# Patient Record
Sex: MALE | Race: WHITE | Employment: FULL TIME | URBAN - METROPOLITAN AREA
[De-identification: names, ages, dates, MRNs, and addresses within clinical notes are randomized per-mention and may not be internally consistent; named-entity substitution may affect disease eponyms.]

---

## 2018-06-28 ENCOUNTER — HOSPITAL ENCOUNTER (OUTPATIENT)
Age: 43
Discharge: HOME | End: 2018-06-30
Payer: COMMERCIAL

## 2018-06-28 ENCOUNTER — OFFICE VISIT (OUTPATIENT)
Dept: FAMILY MEDICINE CLINIC | Age: 43
End: 2018-06-28
Payer: COMMERCIAL

## 2018-06-28 VITALS
HEIGHT: 74 IN | BODY MASS INDEX: 34.65 KG/M2 | DIASTOLIC BLOOD PRESSURE: 84 MMHG | SYSTOLIC BLOOD PRESSURE: 120 MMHG | HEART RATE: 85 BPM | OXYGEN SATURATION: 98 % | WEIGHT: 270 LBS | TEMPERATURE: 97.4 F

## 2018-06-28 DIAGNOSIS — K76.0 NONALCOHOLIC FATTY LIVER DISEASE WITHOUT NONALCOHOLIC STEATOHEPATITIS (NASH): Primary | ICD-10-CM

## 2018-06-28 DIAGNOSIS — K76.0 NONALCOHOLIC FATTY LIVER DISEASE WITHOUT NONALCOHOLIC STEATOHEPATITIS (NASH): ICD-10-CM

## 2018-06-28 LAB
ALBUMIN SERPL-MCNC: 4.8 G/DL (ref 3.5–5.2)
ALP BLD-CCNC: 62 U/L (ref 40–129)
ALT SERPL-CCNC: 29 U/L (ref 0–40)
ANION GAP SERPL CALCULATED.3IONS-SCNC: 15 MMOL/L (ref 7–16)
AST SERPL-CCNC: 29 U/L (ref 0–39)
BILIRUB SERPL-MCNC: 0.7 MG/DL (ref 0–1.2)
BUN BLDV-MCNC: 18 MG/DL (ref 6–20)
CALCIUM SERPL-MCNC: 10 MG/DL (ref 8.6–10.2)
CHLORIDE BLD-SCNC: 99 MMOL/L (ref 98–107)
CO2: 27 MMOL/L (ref 22–29)
CREAT SERPL-MCNC: 1.4 MG/DL (ref 0.7–1.2)
GFR AFRICAN AMERICAN: >60
GFR NON-AFRICAN AMERICAN: 56 ML/MIN/1.73
GLUCOSE BLD-MCNC: 70 MG/DL (ref 74–109)
POTASSIUM SERPL-SCNC: 5.2 MMOL/L (ref 3.5–5)
SODIUM BLD-SCNC: 141 MMOL/L (ref 132–146)
TOTAL PROTEIN: 7.8 G/DL (ref 6.4–8.3)

## 2018-06-28 PROCEDURE — 4004F PT TOBACCO SCREEN RCVD TLK: CPT | Performed by: FAMILY MEDICINE

## 2018-06-28 PROCEDURE — 36415 COLL VENOUS BLD VENIPUNCTURE: CPT | Performed by: FAMILY MEDICINE

## 2018-06-28 PROCEDURE — 99213 OFFICE O/P EST LOW 20 MIN: CPT | Performed by: FAMILY MEDICINE

## 2018-06-28 PROCEDURE — 80053 COMPREHEN METABOLIC PANEL: CPT

## 2018-06-28 PROCEDURE — G8417 CALC BMI ABV UP PARAM F/U: HCPCS | Performed by: FAMILY MEDICINE

## 2018-06-28 PROCEDURE — G8427 DOCREV CUR MEDS BY ELIG CLIN: HCPCS | Performed by: FAMILY MEDICINE

## 2018-06-29 DIAGNOSIS — R79.89 ELEVATED SERUM CREATININE: Primary | ICD-10-CM

## 2018-07-02 ENCOUNTER — NURSE ONLY (OUTPATIENT)
Dept: FAMILY MEDICINE CLINIC | Age: 43
End: 2018-07-02
Payer: COMMERCIAL

## 2018-07-02 ENCOUNTER — HOSPITAL ENCOUNTER (OUTPATIENT)
Age: 43
Discharge: HOME | End: 2018-07-04
Payer: COMMERCIAL

## 2018-07-02 DIAGNOSIS — R79.89 ELEVATED SERUM CREATININE: Primary | ICD-10-CM

## 2018-07-02 DIAGNOSIS — R79.89 ELEVATED SERUM CREATININE: ICD-10-CM

## 2018-07-02 LAB
ANION GAP SERPL CALCULATED.3IONS-SCNC: 12 MMOL/L (ref 7–16)
BUN BLDV-MCNC: 14 MG/DL (ref 6–20)
CALCIUM SERPL-MCNC: 9.5 MG/DL (ref 8.6–10.2)
CHLORIDE BLD-SCNC: 102 MMOL/L (ref 98–107)
CO2: 26 MMOL/L (ref 22–29)
CREAT SERPL-MCNC: 1.2 MG/DL (ref 0.7–1.2)
GFR AFRICAN AMERICAN: >60
GFR NON-AFRICAN AMERICAN: >60 ML/MIN/1.73
GLUCOSE BLD-MCNC: 86 MG/DL (ref 74–109)
POTASSIUM SERPL-SCNC: 5.1 MMOL/L (ref 3.5–5)
SODIUM BLD-SCNC: 140 MMOL/L (ref 132–146)

## 2018-07-02 PROCEDURE — 80048 BASIC METABOLIC PNL TOTAL CA: CPT

## 2018-07-02 PROCEDURE — 36415 COLL VENOUS BLD VENIPUNCTURE: CPT | Performed by: FAMILY MEDICINE

## 2018-07-05 DIAGNOSIS — E87.5 HYPERKALEMIA: Primary | ICD-10-CM

## 2018-08-02 RX ORDER — LISINOPRIL 20 MG/1
20 TABLET ORAL DAILY
Qty: 30 TABLET | Refills: 5 | Status: SHIPPED | OUTPATIENT
Start: 2018-08-02 | End: 2019-03-06 | Stop reason: SDUPTHER

## 2018-09-06 ENCOUNTER — NURSE ONLY (OUTPATIENT)
Dept: FAMILY MEDICINE CLINIC | Age: 43
End: 2018-09-06
Payer: COMMERCIAL

## 2018-09-06 ENCOUNTER — HOSPITAL ENCOUNTER (OUTPATIENT)
Age: 43
Discharge: HOME | End: 2018-09-08
Payer: COMMERCIAL

## 2018-09-06 DIAGNOSIS — E87.5 HYPERKALEMIA: ICD-10-CM

## 2018-09-06 DIAGNOSIS — E87.5 HYPERKALEMIA: Primary | ICD-10-CM

## 2018-09-06 LAB — POTASSIUM SERPL-SCNC: 5 MMOL/L (ref 3.5–5)

## 2018-09-06 PROCEDURE — 36415 COLL VENOUS BLD VENIPUNCTURE: CPT | Performed by: FAMILY MEDICINE

## 2018-09-06 PROCEDURE — 84132 ASSAY OF SERUM POTASSIUM: CPT

## 2019-03-06 RX ORDER — LISINOPRIL 20 MG/1
20 TABLET ORAL DAILY
Qty: 30 TABLET | Refills: 5 | Status: SHIPPED | OUTPATIENT
Start: 2019-03-06 | End: 2019-04-02 | Stop reason: SDUPTHER

## 2019-03-15 ENCOUNTER — TELEPHONE (OUTPATIENT)
Dept: FAMILY MEDICINE CLINIC | Age: 44
End: 2019-03-15

## 2019-03-18 ENCOUNTER — TELEPHONE (OUTPATIENT)
Dept: FAMILY MEDICINE CLINIC | Age: 44
End: 2019-03-18

## 2019-03-25 ENCOUNTER — TELEPHONE (OUTPATIENT)
Dept: FAMILY MEDICINE CLINIC | Age: 44
End: 2019-03-25

## 2019-03-25 DIAGNOSIS — M10.071 ACUTE IDIOPATHIC GOUT INVOLVING TOE OF RIGHT FOOT: ICD-10-CM

## 2019-03-25 RX ORDER — COLCHICINE 0.6 MG/1
TABLET ORAL
Qty: 3 TABLET | Refills: 2 | Status: SHIPPED | OUTPATIENT
Start: 2019-03-25 | End: 2019-04-16

## 2019-03-25 RX ORDER — INDOMETHACIN 50 MG/1
CAPSULE ORAL
Qty: 8 CAPSULE | Refills: 2 | Status: SHIPPED | OUTPATIENT
Start: 2019-03-25 | End: 2019-05-09

## 2019-04-02 ENCOUNTER — HOSPITAL ENCOUNTER (OUTPATIENT)
Age: 44
Discharge: HOME | End: 2019-04-04
Payer: COMMERCIAL

## 2019-04-02 ENCOUNTER — OFFICE VISIT (OUTPATIENT)
Dept: FAMILY MEDICINE CLINIC | Age: 44
End: 2019-04-02
Payer: COMMERCIAL

## 2019-04-02 VITALS
WEIGHT: 284 LBS | HEIGHT: 74 IN | OXYGEN SATURATION: 99 % | HEART RATE: 89 BPM | TEMPERATURE: 97.9 F | BODY MASS INDEX: 36.45 KG/M2 | SYSTOLIC BLOOD PRESSURE: 134 MMHG | DIASTOLIC BLOOD PRESSURE: 88 MMHG

## 2019-04-02 DIAGNOSIS — E87.5 HYPERKALEMIA: ICD-10-CM

## 2019-04-02 DIAGNOSIS — E87.5 HYPERKALEMIA: Primary | ICD-10-CM

## 2019-04-02 DIAGNOSIS — I10 ESSENTIAL HYPERTENSION: ICD-10-CM

## 2019-04-02 LAB
ALBUMIN SERPL-MCNC: 4.8 G/DL (ref 3.5–5.2)
ALP BLD-CCNC: 73 U/L (ref 40–129)
ALT SERPL-CCNC: 39 U/L (ref 0–40)
ANION GAP SERPL CALCULATED.3IONS-SCNC: 14 MMOL/L (ref 7–16)
AST SERPL-CCNC: 39 U/L (ref 0–39)
BASOPHILS ABSOLUTE: 0.08 E9/L (ref 0–0.2)
BASOPHILS RELATIVE PERCENT: 0.8 % (ref 0–2)
BILIRUB SERPL-MCNC: 0.4 MG/DL (ref 0–1.2)
BUN BLDV-MCNC: 19 MG/DL (ref 6–20)
CALCIUM SERPL-MCNC: 10 MG/DL (ref 8.6–10.2)
CHLORIDE BLD-SCNC: 100 MMOL/L (ref 98–107)
CO2: 26 MMOL/L (ref 22–29)
CREAT SERPL-MCNC: 1.6 MG/DL (ref 0.7–1.2)
EOSINOPHILS ABSOLUTE: 0.37 E9/L (ref 0.05–0.5)
EOSINOPHILS RELATIVE PERCENT: 3.7 % (ref 0–6)
GFR AFRICAN AMERICAN: 58
GFR NON-AFRICAN AMERICAN: 48 ML/MIN/1.73
GLUCOSE BLD-MCNC: 85 MG/DL (ref 74–99)
HCT VFR BLD CALC: 46.4 % (ref 37–54)
HEMOGLOBIN: 15.3 G/DL (ref 12.5–16.5)
IMMATURE GRANULOCYTES #: 0.05 E9/L
IMMATURE GRANULOCYTES %: 0.5 % (ref 0–5)
LYMPHOCYTES ABSOLUTE: 2.68 E9/L (ref 1.5–4)
LYMPHOCYTES RELATIVE PERCENT: 26.6 % (ref 20–42)
MCH RBC QN AUTO: 28.9 PG (ref 26–35)
MCHC RBC AUTO-ENTMCNC: 33 % (ref 32–34.5)
MCV RBC AUTO: 87.5 FL (ref 80–99.9)
MONOCYTES ABSOLUTE: 0.71 E9/L (ref 0.1–0.95)
MONOCYTES RELATIVE PERCENT: 7 % (ref 2–12)
NEUTROPHILS ABSOLUTE: 6.2 E9/L (ref 1.8–7.3)
NEUTROPHILS RELATIVE PERCENT: 61.4 % (ref 43–80)
PDW BLD-RTO: 12.6 FL (ref 11.5–15)
PLATELET # BLD: 357 E9/L (ref 130–450)
PMV BLD AUTO: 9.9 FL (ref 7–12)
POTASSIUM SERPL-SCNC: 4.9 MMOL/L (ref 3.5–5)
RBC # BLD: 5.3 E12/L (ref 3.8–5.8)
SODIUM BLD-SCNC: 140 MMOL/L (ref 132–146)
TOTAL PROTEIN: 8.2 G/DL (ref 6.4–8.3)
WBC # BLD: 10.1 E9/L (ref 4.5–11.5)

## 2019-04-02 PROCEDURE — 85025 COMPLETE CBC W/AUTO DIFF WBC: CPT

## 2019-04-02 PROCEDURE — 99213 OFFICE O/P EST LOW 20 MIN: CPT | Performed by: FAMILY MEDICINE

## 2019-04-02 PROCEDURE — 80053 COMPREHEN METABOLIC PANEL: CPT

## 2019-04-02 RX ORDER — LISINOPRIL 40 MG/1
40 TABLET ORAL DAILY
Qty: 7 TABLET | Refills: 0 | Status: SHIPPED | OUTPATIENT
Start: 2019-04-02 | End: 2019-04-04

## 2019-04-02 ASSESSMENT — PATIENT HEALTH QUESTIONNAIRE - PHQ9
2. FEELING DOWN, DEPRESSED OR HOPELESS: 0
SUM OF ALL RESPONSES TO PHQ9 QUESTIONS 1 & 2: 0
SUM OF ALL RESPONSES TO PHQ QUESTIONS 1-9: 0
1. LITTLE INTEREST OR PLEASURE IN DOING THINGS: 0
SUM OF ALL RESPONSES TO PHQ QUESTIONS 1-9: 0

## 2019-04-02 NOTE — PROGRESS NOTES
[facility]  Office Progress Note - Dr. Shaniqua Chavez  4/2/19    Chief Complaint   Patient presents with    Hypertension        S: about 3 weeks ago started with headaches. Wasn't feeling right. 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 Bumped up lisinopril to 30mg and then to 40mg. Has been checking since then and seeing 130s/80s-90s. Denies CP, sob, abd pain, + headaches, No vision changes. Has tx headache with ibu. Resolved since increasing lisinopril. Hx HTN since 28 yo or so. Wt Readings from Last 3 Encounters:   04/02/19 284 lb (128.8 kg)   06/28/18 270 lb (122.5 kg)   02/26/18 289 lb (131.1 kg)     Has decreased salt consumption. Did have a gout flare recently as well. About 2 weeks ago. Called and sent in indomethacin. Did not take colchicine since he was about 10 days into an attack. Shae screening neg. Family History   Problem Relation Age of Onset    Heart Disease Father        Past Surgical History:   Procedure Laterality Date    CYST REMOVAL Right     Back    WISDOM TOOTH EXTRACTION         Social History     Tobacco Use    Smoking status: Never Smoker    Smokeless tobacco: Current User     Types: Chew   Substance Use Topics    Alcohol use: Yes     Comment: SOCIAL    Drug use: No       ROS:  No CP, No palpitations,   No sob, No cough,   No abd pain, No heartburn,   +headaches,   No tingling, No numbness, No weakness,   No bowel changes, No hematochezia, No melena,  No bladder changes, No hematuria  No skin rashes, No skin lesions. No vision changes, No hearing changes,   No polyuria, polydipsia, polyphagia. Stable mood. ROS otherwise negative unless as listed in HPI. Chart reviewed and updated where appropriate for PMH, Fam, and Soc Hx.     Physical Exam   /88 (Site: Right Upper Arm, Position: Sitting, Cuff Size: Large Adult)   Pulse 89   Temp 97.9 °F (36.6 °C) (Oral)   Ht 6' 2\" (1.88 m)   Wt 284 lb (128.8 kg)   SpO2 99%   BMI 36.46 kg/m²   Wt Readings from Last 3 Encounters:   04/02/19 284 lb (128.8 kg)   06/28/18 270 lb (122.5 kg)   02/26/18 289 lb (131.1 kg)       Constitutional:    He is oriented to person, place, and time. He appears well-developed and well-nourished. HENT:    Nose: Nose normal.    Mouth/Throat: Oropharynx is clear and moist.   Eyes:    Conjunctivae are normal.    Pupils are equal, round, and reactive to light. EOMI. Neck:    Normal range of motion. No thyromegaly or nodules noted. No bruit. Cardiovascular:    Normal rate, regular rhythm and normal heart sounds. No murmur. No gallop and no friction rub. Pulmonary/Chest:    Effort normal and breath sounds normal.    No wheezes. No rales or rhonchi. Abdominal:    Soft. Bowel sounds are normal.    No distension. No tenderness. Musculoskeletal:    Normal range of motion. No joint swelling noted. No peripheral edema. Current Outpatient Medications on File Prior to Visit   Medication Sig Dispense Refill    indomethacin (INDOCIN) 50 MG capsule 1 capsule by mouth 2 times daily during gout flares. 8 capsule 2    colchicine (COLCRYS) 0.6 MG tablet Take two tablets by mouth and then an additional tablet an hour later. May cause diarrhea. Take at onset of gout flares. 3 tablet 2     No current facility-administered medications on file prior to visit. Patient Active Problem List   Diagnosis Code    Pure hypercholesterolemia E78.00       Miracle / Jose Alejandro Cookie was seen today for hypertension. Diagnoses and all orders for this visit:    Hyperkalemia  -     Comprehensive Metabolic Panel; Future    Essential hypertension  -     CBC Auto Differential; Future  -     Comprehensive Metabolic Panel; Future  Continued increased dose of lisinopril at 40mg daily. Check labs in response to recent increase. Check for CHERYL given young HTN dx. Doubt hyperaldo as K+ has always been normal to slightly high.      Other orders  -     aluminum chloride (DRYSOL) 20 % external solution; Apply topically nightly. Return in about 6 weeks (around 5/14/2019). Patient counseled to follow up sooner or seek more acute care if symptoms worsening. Electronically signed by Asmita Antonio MD on 4/7/2019    This note may have been created using dictation software.  Efforts were made to reduce grammatical or syntax errors, but some may persist.

## 2019-04-04 DIAGNOSIS — R79.89 ELEVATED SERUM CREATININE: Primary | ICD-10-CM

## 2019-04-04 RX ORDER — AMLODIPINE BESYLATE 5 MG/1
5 TABLET ORAL DAILY
Qty: 30 TABLET | Refills: 0 | Status: SHIPPED | OUTPATIENT
Start: 2019-04-04 | End: 2019-04-25

## 2019-04-16 ENCOUNTER — NURSE ONLY (OUTPATIENT)
Dept: FAMILY MEDICINE CLINIC | Age: 44
End: 2019-04-16
Payer: COMMERCIAL

## 2019-04-16 ENCOUNTER — TELEPHONE (OUTPATIENT)
Dept: FAMILY MEDICINE CLINIC | Age: 44
End: 2019-04-16

## 2019-04-16 ENCOUNTER — HOSPITAL ENCOUNTER (OUTPATIENT)
Age: 44
Discharge: HOME | End: 2019-04-18
Payer: COMMERCIAL

## 2019-04-16 DIAGNOSIS — R79.89 ELEVATED SERUM CREATININE: Primary | ICD-10-CM

## 2019-04-16 DIAGNOSIS — R79.89 ELEVATED SERUM CREATININE: ICD-10-CM

## 2019-04-16 PROCEDURE — 80048 BASIC METABOLIC PNL TOTAL CA: CPT

## 2019-04-16 PROCEDURE — 36415 COLL VENOUS BLD VENIPUNCTURE: CPT | Performed by: FAMILY MEDICINE

## 2019-04-16 RX ORDER — PREDNISONE 50 MG/1
50 TABLET ORAL DAILY
Qty: 5 TABLET | Refills: 0 | Status: SHIPPED | OUTPATIENT
Start: 2019-04-16 | End: 2019-04-21

## 2019-04-16 NOTE — TELEPHONE ENCOUNTER
Pt was in today and expressed that his gout has flared up. He now has it in the left foot. He reports colchicine gave him diarrhea and indomethacin is not helping. He had been told in the past by a doctor that BP med could cause gout.

## 2019-04-16 NOTE — TELEPHONE ENCOUNTER
Yes, one type of blood pressure medication and contribute to gout. He is not on that kind of blood pressure medication.     I sent prednisone to pharmacy instead of colchicine

## 2019-04-17 ENCOUNTER — HOSPITAL ENCOUNTER (OUTPATIENT)
Dept: ULTRASOUND IMAGING | Age: 44
Discharge: HOME | End: 2019-04-19
Payer: COMMERCIAL

## 2019-04-17 DIAGNOSIS — I10 ESSENTIAL HYPERTENSION: ICD-10-CM

## 2019-04-17 LAB
ANION GAP SERPL CALCULATED.3IONS-SCNC: 17 MMOL/L (ref 7–16)
BUN BLDV-MCNC: 13 MG/DL (ref 6–20)
CALCIUM SERPL-MCNC: 9.5 MG/DL (ref 8.6–10.2)
CHLORIDE BLD-SCNC: 102 MMOL/L (ref 98–107)
CO2: 22 MMOL/L (ref 22–29)
CREAT SERPL-MCNC: 1.1 MG/DL (ref 0.7–1.2)
GFR AFRICAN AMERICAN: >60
GFR NON-AFRICAN AMERICAN: >60 ML/MIN/1.73
GLUCOSE BLD-MCNC: 81 MG/DL (ref 74–99)
POTASSIUM SERPL-SCNC: 4.3 MMOL/L (ref 3.5–5)
SODIUM BLD-SCNC: 141 MMOL/L (ref 132–146)

## 2019-04-17 PROCEDURE — 93975 VASCULAR STUDY: CPT

## 2019-04-17 PROCEDURE — 76770 US EXAM ABDO BACK WALL COMP: CPT

## 2019-04-25 RX ORDER — PREDNISONE 10 MG/1
TABLET ORAL
Qty: 50 TABLET | Refills: 0 | OUTPATIENT
Start: 2019-04-25 | End: 2019-06-10 | Stop reason: SDUPTHER

## 2019-04-25 RX ORDER — AMLODIPINE BESYLATE 10 MG/1
10 TABLET ORAL DAILY
Qty: 30 TABLET | Refills: 1 | Status: SHIPPED | OUTPATIENT
Start: 2019-04-25 | End: 2019-07-02 | Stop reason: SDUPTHER

## 2019-05-09 ENCOUNTER — OFFICE VISIT (OUTPATIENT)
Dept: FAMILY MEDICINE CLINIC | Age: 44
End: 2019-05-09
Payer: COMMERCIAL

## 2019-05-09 VITALS
TEMPERATURE: 97.9 F | HEART RATE: 80 BPM | SYSTOLIC BLOOD PRESSURE: 140 MMHG | BODY MASS INDEX: 36.85 KG/M2 | DIASTOLIC BLOOD PRESSURE: 90 MMHG | OXYGEN SATURATION: 96 % | WEIGHT: 287 LBS

## 2019-05-09 DIAGNOSIS — I10 ESSENTIAL HYPERTENSION: Primary | ICD-10-CM

## 2019-05-09 DIAGNOSIS — M1A.0790 IDIOPATHIC CHRONIC GOUT OF FOOT WITHOUT TOPHUS, UNSPECIFIED LATERALITY: ICD-10-CM

## 2019-05-09 PROCEDURE — 99214 OFFICE O/P EST MOD 30 MIN: CPT | Performed by: FAMILY MEDICINE

## 2019-05-09 RX ORDER — ALLOPURINOL 100 MG/1
100 TABLET ORAL DAILY
Qty: 30 TABLET | Refills: 2 | Status: SHIPPED | OUTPATIENT
Start: 2019-05-09 | End: 2019-06-12

## 2019-05-09 RX ORDER — LOSARTAN POTASSIUM 25 MG/1
25 TABLET ORAL DAILY
Qty: 30 TABLET | Refills: 0 | Status: SHIPPED | OUTPATIENT
Start: 2019-05-09 | End: 2019-06-12

## 2019-05-09 NOTE — PROGRESS NOTES
[facility]  Office Progress Note - Dr. Bonny Nava  5/9/19    Chief Complaint   Patient presents with    Hypertension     still not under control - running 150/92        S:   Hypertension  Follow-up  Blood pressure is borderline controlled today. BP Readings from Last 3 Encounters:   05/09/19 (!) 140/90   04/02/19 134/88   06/28/18 120/84   Now on max dose amlodipine. Patient continues medications regularly. Compliance is good. Denies CP, sob, abd pain, headaches, vision changes, dizziness, hypotensive symptoms. No side effects from medications noted. Mother FH HTN, and sone dealing with some mild HTN problems. PAtient neg for CHERYL screening with US.   DUSTY unlikely based on screening. Still seeing elevated readings at home. Cr bump noted on lisinopril resolved off medication. 1.2 >  1.6 > 1.1      Gout  Feels like just hasnt calmed over past couple months. Not as severe after Tx with 2 courses of abx, most recently long taper. Colchicine gave significant diarrhea and did not want to repeat. Forefoot. BL. + Red, + swelling, + ttp. Out of acute episode. Family History   Problem Relation Age of Onset    Heart Disease Father        Past Surgical History:   Procedure Laterality Date    CYST REMOVAL Right     Back    WISDOM TOOTH EXTRACTION         Social History     Tobacco Use    Smoking status: Never Smoker    Smokeless tobacco: Current User     Types: Chew   Substance Use Topics    Alcohol use: Yes     Comment: SOCIAL    Drug use: No       ROS:  No CP, No palpitations,   No sob, No cough,   No abd pain, No heartburn,   No headaches,   No tingling, No numbness, No weakness,   No bowel changes, No hematochezia, No melena,  No bladder changes, No hematuria  No skin rashes, No skin lesions. No vision changes, No hearing changes,   No polyuria, polydipsia, polyphagia. Stable mood. ROS otherwise negative unless as listed in HPI.     Chart reviewed and updated where appropriate for PMH, Fam, and Soc Hx. Physical Exam   BP (!) 140/90 (Site: Right Upper Arm, Position: Sitting, Cuff Size: Large Adult)   Pulse 80   Temp 97.9 °F (36.6 °C) (Oral)   Wt 287 lb (130.2 kg)   SpO2 96%   BMI 36.85 kg/m²   Wt Readings from Last 3 Encounters:   05/09/19 287 lb (130.2 kg)   04/02/19 284 lb (128.8 kg)   06/28/18 270 lb (122.5 kg)       Constitutional:    He is oriented to person, place, and time. He appears well-developed and well-nourished. HENT:    Right Ear: Tympanic membrane, external ear and ear canal normal.    Left Ear: Tympanic membrane, external ear and ear canal normal.     Nose: Nose normal.    Mouth/Throat: Oropharynx is clear and moist.   Eyes:    Conjunctivae are normal.    Pupils are equal, round, and reactive to light. EOMI. Neck:    Normal range of motion. No thyromegaly or nodules noted. No bruit. Left posterior ear / neck cyst - about 1x2cm or so, feels to be in the skin layer. Cyst vs lipoma likely. Had a cyst on his back removed hisotircally. Cardiovascular:    Normal rate, regular rhythm and normal heart sounds. No murmur. No gallop and no friction rub. Pulmonary/Chest:    Effort normal and breath sounds normal.    No wheezes. No rales or rhonchi. Abdominal:    Soft. Bowel sounds are normal.    No distension. No tenderness. Musculoskeletal:    Normal range of motion. +joint swelling noted - feet. No peripheral edema. Skin:    Skin is warm and dry. No rashes, lesions. Psychiatric:    He has a normal mood and affect. Normal groom and dress. Current Outpatient Medications on File Prior to Visit   Medication Sig Dispense Refill    amLODIPine (NORVASC) 10 MG tablet Take 1 tablet by mouth daily For blood pressure. 30 tablet 1    aluminum chloride (DRYSOL) 20 % external solution Apply topically nightly. 60 mL 2     No current facility-administered medications on file prior to visit.         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 Diagnoses and all orders for this visit:    Essential hypertension, persisting high despite inc amlodpine to max dose. Improved from previous highs.   -   Add  losartan (COZAAR) 25 MG tablet; Take 1 tablet by mouth daily  Close follow up on Cr as had bump with lisinopril. Cont Amlodipine. CHERYL US scan negative. K+ not suggestive of hyperaldo.   + Family History of HTN in mom and son also working on it. Patient with this for decades. Idiopathic chronic gout of foot without tophus, unspecified laterality, not resolved  -  Start   allopurinol (ZYLOPRIM) 100 MG tablet; Take 1 tablet by mouth daily For gout prevention. Complete prednisone taper - nearly done. Labs in 2 weeks, appt in 1 month  Patient counseled to follow up sooner or seek more acute care if symptoms worsening. Electronically signed by Janet Gaitan MD on 5/12/2019    This note may have been created using dictation software.  Efforts were made to reduce grammatical or syntax errors, but some may persist.

## 2019-05-12 PROBLEM — I10 ESSENTIAL HYPERTENSION: Status: ACTIVE | Noted: 2019-05-12

## 2019-05-12 PROBLEM — M1A.0790 IDIOPATHIC CHRONIC GOUT OF FOOT WITHOUT TOPHUS: Status: ACTIVE | Noted: 2019-05-12

## 2019-05-21 ENCOUNTER — TELEPHONE (OUTPATIENT)
Dept: FAMILY MEDICINE CLINIC | Age: 44
End: 2019-05-21

## 2019-05-21 DIAGNOSIS — M1A.0790 IDIOPATHIC CHRONIC GOUT OF FOOT WITHOUT TOPHUS, UNSPECIFIED LATERALITY: Primary | ICD-10-CM

## 2019-05-21 DIAGNOSIS — I10 ESSENTIAL HYPERTENSION: ICD-10-CM

## 2019-05-28 ENCOUNTER — NURSE ONLY (OUTPATIENT)
Dept: FAMILY MEDICINE CLINIC | Age: 44
End: 2019-05-28
Payer: COMMERCIAL

## 2019-05-28 ENCOUNTER — HOSPITAL ENCOUNTER (OUTPATIENT)
Age: 44
Discharge: HOME | End: 2019-05-30
Payer: COMMERCIAL

## 2019-05-28 DIAGNOSIS — I10 ESSENTIAL HYPERTENSION: ICD-10-CM

## 2019-05-28 DIAGNOSIS — M1A.0790 IDIOPATHIC CHRONIC GOUT OF FOOT WITHOUT TOPHUS, UNSPECIFIED LATERALITY: ICD-10-CM

## 2019-05-28 DIAGNOSIS — M1A.0790 IDIOPATHIC CHRONIC GOUT OF FOOT WITHOUT TOPHUS, UNSPECIFIED LATERALITY: Primary | ICD-10-CM

## 2019-05-28 PROCEDURE — 36415 COLL VENOUS BLD VENIPUNCTURE: CPT | Performed by: FAMILY MEDICINE

## 2019-05-28 PROCEDURE — 80048 BASIC METABOLIC PNL TOTAL CA: CPT

## 2019-05-28 PROCEDURE — 84550 ASSAY OF BLOOD/URIC ACID: CPT

## 2019-05-29 LAB
ANION GAP SERPL CALCULATED.3IONS-SCNC: 15 MMOL/L (ref 7–16)
BUN BLDV-MCNC: 12 MG/DL (ref 6–20)
CALCIUM SERPL-MCNC: 9.9 MG/DL (ref 8.6–10.2)
CHLORIDE BLD-SCNC: 100 MMOL/L (ref 98–107)
CO2: 25 MMOL/L (ref 22–29)
CREAT SERPL-MCNC: 1.1 MG/DL (ref 0.7–1.2)
GFR AFRICAN AMERICAN: >60
GFR NON-AFRICAN AMERICAN: >60 ML/MIN/1.73
GLUCOSE BLD-MCNC: 74 MG/DL (ref 74–99)
POTASSIUM SERPL-SCNC: 4.5 MMOL/L (ref 3.5–5)
SODIUM BLD-SCNC: 140 MMOL/L (ref 132–146)
URIC ACID, SERUM: 5.8 MG/DL (ref 3.4–7)

## 2019-06-10 ENCOUNTER — TELEPHONE (OUTPATIENT)
Dept: FAMILY MEDICINE CLINIC | Age: 44
End: 2019-06-10

## 2019-06-10 RX ORDER — PREDNISONE 10 MG/1
TABLET ORAL
Qty: 50 TABLET | Refills: 0 | OUTPATIENT
Start: 2019-06-10 | End: 2019-06-20

## 2019-06-10 NOTE — TELEPHONE ENCOUNTER
Prednisone Rx ordered, please phone to pharmacy. Will need to increase allopurinol dose (after gout flare over). If still happening after allopurinol dose increase, then will need to consider other diagnoses. Which foot? And is it always that foot?

## 2019-06-10 NOTE — TELEPHONE ENCOUNTER
Patient phoned and states he is having a bad flare up of gout in his foot, he can barely walk. He takes Allopurinol daily but its not helping. Please advise.

## 2019-06-10 NOTE — TELEPHONE ENCOUNTER
Notified patient. Patient verbalized understanding. Called script into pharmacy. Pt states it is his left foot and it is mostly that foot. He reports his right knee is swollen and stiff, this is new and has never happened before.

## 2019-06-12 ENCOUNTER — OFFICE VISIT (OUTPATIENT)
Dept: FAMILY MEDICINE CLINIC | Age: 44
End: 2019-06-12
Payer: COMMERCIAL

## 2019-06-12 VITALS
DIASTOLIC BLOOD PRESSURE: 90 MMHG | HEART RATE: 97 BPM | WEIGHT: 287 LBS | OXYGEN SATURATION: 98 % | TEMPERATURE: 98.3 F | HEIGHT: 74 IN | BODY MASS INDEX: 36.83 KG/M2 | SYSTOLIC BLOOD PRESSURE: 146 MMHG

## 2019-06-12 DIAGNOSIS — M1A.0790 IDIOPATHIC CHRONIC GOUT OF FOOT WITHOUT TOPHUS, UNSPECIFIED LATERALITY: ICD-10-CM

## 2019-06-12 DIAGNOSIS — I10 ESSENTIAL HYPERTENSION: ICD-10-CM

## 2019-06-12 PROCEDURE — 99213 OFFICE O/P EST LOW 20 MIN: CPT | Performed by: FAMILY MEDICINE

## 2019-06-12 RX ORDER — ALLOPURINOL 300 MG/1
300 TABLET ORAL DAILY
Qty: 30 TABLET | Refills: 2 | Status: SHIPPED | OUTPATIENT
Start: 2019-06-12 | End: 2019-10-01 | Stop reason: SDUPTHER

## 2019-06-12 RX ORDER — LOSARTAN POTASSIUM 50 MG/1
50 TABLET ORAL DAILY
Qty: 30 TABLET | Refills: 2 | Status: SHIPPED
Start: 2019-06-12 | End: 2020-09-11

## 2019-06-12 NOTE — PROGRESS NOTES
[facility]  Office Progress Note - Dr. Tomy Lu  6/12/19    Chief Complaint   Patient presents with    Hypertension    Medication Refill        S:   Hypertension  Follow-up  Blood pressure is not controlled today. He did not take his losartan for the past 2 or 3 days because he ran out. BP Readings from Last 3 Encounters:   06/12/19 (!) 146/90   05/09/19 (!) 140/90   04/02/19 134/88   He continues amlodipine 10 mg daily and we added 25 mg of losartan 1 month ago. He has been tolerating that without issue. He has generally felt well. Blood pressure measurements have had improved systolic readings, but diastolic readings are still regularly in the 90s. Patient continues medications regularly. Compliance is good. Denies CP, sob, abd pain, headaches, vision changes, dizziness, hypotensive symptoms. No side effects from medications noted. Gout  We started him on allopurinol after his last gout flare. He tolerated this without issue, but still had another recent gout flare while on the allopurinol medication. His uric acid level was recently evaluated while on the allopurinol and was normal at 5.8. Just a few days ago he called with redness swelling and pain of his first MTP joint on his left foot. This is his usual gouty joint. He also interestingly had some left knee pain and swelling and also some mild left elbow pain. He was prescribed prednisone and things quickly calmed down after the first dose or so that medication. Uric acid was elevated in the past at 7.1 few years ago. He has been eating some occasional meals that have higher purines.     Family History   Problem Relation Age of Onset    Heart Disease Father        Past Surgical History:   Procedure Laterality Date    CYST REMOVAL Right     Back    WISDOM TOOTH EXTRACTION         Social History     Tobacco Use    Smoking status: Never Smoker    Smokeless tobacco: Current User     Types: Chew   Substance Use Topics    Alcohol use: Yes     Comment: SOCIAL    Drug use: No       ROS:  No CP, No palpitations,   No sob, No cough,   No abd pain, No heartburn,   No headaches,   No tingling, No numbness, No weakness,   No bowel changes, No hematochezia, No melena,  No bladder changes, No hematuria  No skin rashes, No skin lesions. No vision changes, No hearing changes,   No polyuria, polydipsia, polyphagia. Stable mood. ROS otherwise negative unless as listed in HPI. Chart reviewed and updated where appropriate for PMH, Fam, and Soc Hx. Physical Exam   BP (!) 146/90 (Site: Right Upper Arm, Position: Sitting, Cuff Size: Large Adult)   Pulse 97   Temp 98.3 °F (36.8 °C) (Oral)   Ht 6' 2\" (1.88 m)   Wt 287 lb (130.2 kg)   SpO2 98%   BMI 36.85 kg/m²   Wt Readings from Last 3 Encounters:   06/12/19 287 lb (130.2 kg)   05/09/19 287 lb (130.2 kg)   04/02/19 284 lb (128.8 kg)       Constitutional:    He is oriented to person, place, and time. He appears well-developed and well-nourished. HENT:    Nose: Nose normal.    Mouth/Throat: Oropharynx is clear and moist.   Eyes:    Conjunctivae are normal.    Pupils are equal, round, and reactive to light. EOMI. Neck:    Normal range of motion. No thyromegaly or nodules noted. No bruit. Cardiovascular:    Normal rate, regular rhythm and normal heart sounds. No murmur. No gallop and no friction rub. Pulmonary/Chest:    Effort normal and breath sounds normal.    No wheezes. No rales or rhonchi. Abdominal:    Soft. Bowel sounds are normal.    No distension. No tenderness. Musculoskeletal:    Normal range of motion. No joint swelling noted. No redness noted. No peripheral edema. Skin:    Skin is warm and dry. No rashes, lesions.     Current Outpatient Medications on File Prior to Visit   Medication Sig Dispense Refill    predniSONE (DELTASONE) 10 MG tablet 5 tablets by mouth once a day x 3 days, then 4 tablets by mouth once a day x 3 days, then 3 6/12/2019    This note may have been created using dictation software.  Efforts were made to reduce grammatical or syntax errors, but some may persist.

## 2019-06-12 NOTE — PATIENT INSTRUCTIONS
Patient Education        Purine-Restricted Diet: Care Instructions  Your Care Instructions    Purines are substances that are found in some foods. Your body turns purines into uric acid. High levels of uric acid can cause gout, which is a form of arthritis that causes pain and inflammation in joints. You may be able to help control the amount of uric acid in your body by limiting high-purine foods in your diet. Follow-up care is a key part of your treatment and safety. Be sure to make and go to all appointments, and call your doctor if you are having problems. It's also a good idea to know your test results and keep a list of the medicines you take. How can you care for yourself at home? · Plan your meals and snacks around foods that are low in purines and are safe for you to eat. These foods include:  ? Green vegetables and tomatoes. ? Fruits. ? Whole-grain breads, rice, and cereals. ? Eggs, peanut butter, and nuts. ? Low-fat milk, cheese, and other milk products. ? Popcorn. ? Gelatin desserts, chocolate, cocoa, and cakes and sweets, in small amounts. · You can eat certain foods that are medium-high in purines, but eat them only once in a while. These foods include:  ? Legumes, such as dried beans and dried peas. You can have 1 cup cooked legumes each day. ? Asparagus, cauliflower, spinach, mushrooms, and green peas. ? Fish and seafood (other than very high-purine seafood). ? Oatmeal, wheat bran, and wheat germ. · Limit very high-purine foods, including:  ? Organ meats, such as liver, kidneys, sweetbreads, and brains. ? Meats, including dooley, beef, pork, and lamb. ? Game meats and any other meats in large amounts. ? Anchovies, sardines, herring, mackerel, and scallops. ? Gravy. ? Beer. Where can you learn more? Go to https://chpepiceweb.[employer]. org and sign in to your [facility] account.  Enter F448 in the [facility] box to learn more about \"Purine-Restricted Diet: Care Instructions. \"     If you do not have an account, please click on the \"Sign Up Now\" link. Current as of: November 7, 2018  Content Version: 12.0  © 2280-1001 [employer] Incorporated. Care instructions adapted under license by [facility] ([facility]). If you have questions about a medical condition or this instruction, always ask your healthcare professional. [address] any warranty or liability for your use of this information.

## 2019-06-28 ENCOUNTER — TELEPHONE (OUTPATIENT)
Dept: FAMILY MEDICINE CLINIC | Age: 44
End: 2019-06-28

## 2019-06-28 ENCOUNTER — NURSE ONLY (OUTPATIENT)
Dept: FAMILY MEDICINE CLINIC | Age: 44
End: 2019-06-28

## 2019-06-28 VITALS — SYSTOLIC BLOOD PRESSURE: 142 MMHG | DIASTOLIC BLOOD PRESSURE: 80 MMHG

## 2019-06-28 DIAGNOSIS — I10 ESSENTIAL HYPERTENSION: Primary | ICD-10-CM

## 2019-07-02 RX ORDER — AMLODIPINE BESYLATE 10 MG/1
10 TABLET ORAL DAILY
Qty: 30 TABLET | Refills: 5 | Status: SHIPPED | OUTPATIENT
Start: 2019-07-02 | End: 2020-01-15

## 2019-08-05 ENCOUNTER — TELEPHONE (OUTPATIENT)
Dept: PULMONOLOGY | Age: 44
End: 2019-08-05

## 2019-08-08 ENCOUNTER — HOSPITAL ENCOUNTER (OUTPATIENT)
Dept: SLEEP CENTER | Age: 44
Discharge: HOME | End: 2019-08-08
Payer: COMMERCIAL

## 2019-08-08 DIAGNOSIS — I10 ESSENTIAL HYPERTENSION: ICD-10-CM

## 2019-08-08 DIAGNOSIS — R06.83 SNORING: ICD-10-CM

## 2019-08-08 DIAGNOSIS — G47.19 EXCESSIVE DAYTIME SLEEPINESS: Primary | ICD-10-CM

## 2019-08-08 PROCEDURE — 95810 POLYSOM 6/> YRS 4/> PARAM: CPT

## 2019-08-08 PROCEDURE — 95811 POLYSOM 6/>YRS CPAP 4/> PARM: CPT | Performed by: INTERNAL MEDICINE

## 2019-08-08 ASSESSMENT — SLEEP AND FATIGUE QUESTIONNAIRES
HOW LIKELY ARE YOU TO NOD OFF OR FALL ASLEEP WHILE SITTING QUIETLY AFTER LUNCH WITHOUT ALCOHOL: 0
HOW LIKELY ARE YOU TO NOD OFF OR FALL ASLEEP WHILE WATCHING TV: 2
HOW LIKELY ARE YOU TO NOD OFF OR FALL ASLEEP WHILE SITTING INACTIVE IN A PUBLIC PLACE: 0
HOW LIKELY ARE YOU TO NOD OFF OR FALL ASLEEP WHEN YOU ARE A PASSENGER IN A CAR FOR AN HOUR WITHOUT A BREAK: 1
ESS TOTAL SCORE: 5
HOW LIKELY ARE YOU TO NOD OFF OR FALL ASLEEP IN A CAR, WHILE STOPPED FOR A FEW MINUTES IN TRAFFIC: 0
HOW LIKELY ARE YOU TO NOD OFF OR FALL ASLEEP WHILE SITTING AND TALKING TO SOMEONE: 0
HOW LIKELY ARE YOU TO NOD OFF OR FALL ASLEEP WHILE LYING DOWN TO REST IN THE AFTERNOON WHEN CIRCUMSTANCES PERMIT: 1
HOW LIKELY ARE YOU TO NOD OFF OR FALL ASLEEP WHILE SITTING AND READING: 1

## 2019-08-09 VITALS
SYSTOLIC BLOOD PRESSURE: 157 MMHG | HEIGHT: 74 IN | WEIGHT: 290 LBS | DIASTOLIC BLOOD PRESSURE: 91 MMHG | BODY MASS INDEX: 37.22 KG/M2 | HEART RATE: 81 BPM | OXYGEN SATURATION: 98 %

## 2019-09-12 ENCOUNTER — OFFICE VISIT (OUTPATIENT)
Dept: FAMILY MEDICINE CLINIC | Age: 44
End: 2019-09-12
Payer: COMMERCIAL

## 2019-09-12 VITALS
DIASTOLIC BLOOD PRESSURE: 88 MMHG | WEIGHT: 285 LBS | SYSTOLIC BLOOD PRESSURE: 130 MMHG | BODY MASS INDEX: 36.59 KG/M2 | HEART RATE: 108 BPM | OXYGEN SATURATION: 96 % | TEMPERATURE: 98 F

## 2019-09-12 DIAGNOSIS — G47.33 SEVERE OBSTRUCTIVE SLEEP APNEA: ICD-10-CM

## 2019-09-12 DIAGNOSIS — I10 ESSENTIAL HYPERTENSION: Primary | ICD-10-CM

## 2019-09-12 PROCEDURE — 4004F PT TOBACCO SCREEN RCVD TLK: CPT | Performed by: FAMILY MEDICINE

## 2019-09-12 PROCEDURE — G8427 DOCREV CUR MEDS BY ELIG CLIN: HCPCS | Performed by: FAMILY MEDICINE

## 2019-09-12 PROCEDURE — G8417 CALC BMI ABV UP PARAM F/U: HCPCS | Performed by: FAMILY MEDICINE

## 2019-09-12 PROCEDURE — 99213 OFFICE O/P EST LOW 20 MIN: CPT | Performed by: FAMILY MEDICINE

## 2019-10-01 DIAGNOSIS — M1A.0790 IDIOPATHIC CHRONIC GOUT OF FOOT WITHOUT TOPHUS, UNSPECIFIED LATERALITY: ICD-10-CM

## 2019-10-02 RX ORDER — ALLOPURINOL 300 MG/1
300 TABLET ORAL DAILY
Qty: 30 TABLET | Refills: 5 | Status: SHIPPED
Start: 2019-10-02 | End: 2020-07-30

## 2019-10-03 ENCOUNTER — HOSPITAL ENCOUNTER (EMERGENCY)
Age: 44
Discharge: HOME | End: 2019-10-03
Payer: COMMERCIAL

## 2019-10-03 VITALS
SYSTOLIC BLOOD PRESSURE: 140 MMHG | RESPIRATION RATE: 16 BRPM | HEART RATE: 65 BPM | OXYGEN SATURATION: 98 % | DIASTOLIC BLOOD PRESSURE: 88 MMHG | BODY MASS INDEX: 35.94 KG/M2 | TEMPERATURE: 97.9 F | HEIGHT: 74 IN | WEIGHT: 280 LBS

## 2019-10-03 DIAGNOSIS — S61.211A LACERATION OF LEFT INDEX FINGER WITHOUT FOREIGN BODY WITHOUT DAMAGE TO NAIL, INITIAL ENCOUNTER: Primary | ICD-10-CM

## 2019-10-03 PROCEDURE — 2500000003 HC RX 250 WO HCPCS: Performed by: NURSE PRACTITIONER

## 2019-10-03 PROCEDURE — 99282 EMERGENCY DEPT VISIT SF MDM: CPT

## 2019-10-03 PROCEDURE — 12001 RPR S/N/AX/GEN/TRNK 2.5CM/<: CPT

## 2019-10-03 PROCEDURE — 90471 IMMUNIZATION ADMIN: CPT | Performed by: NURSE PRACTITIONER

## 2019-10-03 PROCEDURE — 90715 TDAP VACCINE 7 YRS/> IM: CPT | Performed by: NURSE PRACTITIONER

## 2019-10-03 PROCEDURE — 6360000002 HC RX W HCPCS: Performed by: NURSE PRACTITIONER

## 2019-10-03 RX ORDER — GINSENG 100 MG
CAPSULE ORAL ONCE
Status: DISCONTINUED | OUTPATIENT
Start: 2019-10-03 | End: 2019-10-03 | Stop reason: HOSPADM

## 2019-10-03 RX ORDER — LIDOCAINE HYDROCHLORIDE 10 MG/ML
5 INJECTION, SOLUTION INFILTRATION; PERINEURAL ONCE
Status: COMPLETED | OUTPATIENT
Start: 2019-10-03 | End: 2019-10-03

## 2019-10-03 RX ADMIN — LIDOCAINE HYDROCHLORIDE 5 ML: 10 INJECTION, SOLUTION INFILTRATION; PERINEURAL at 21:53

## 2019-10-03 RX ADMIN — TETANUS TOXOID, REDUCED DIPHTHERIA TOXOID AND ACELLULAR PERTUSSIS VACCINE, ADSORBED 0.5 ML: 5; 2.5; 8; 8; 2.5 SUSPENSION INTRAMUSCULAR at 21:51

## 2019-10-03 ASSESSMENT — PAIN SCALES - GENERAL: PAINLEVEL_OUTOF10: 0

## 2019-10-04 ENCOUNTER — HOSPITAL ENCOUNTER (OUTPATIENT)
Dept: SLEEP CENTER | Age: 44
Discharge: HOME | End: 2019-10-04
Payer: COMMERCIAL

## 2019-10-04 DIAGNOSIS — I10 ESSENTIAL HYPERTENSION: ICD-10-CM

## 2019-10-04 DIAGNOSIS — E66.9 CLASS 2 OBESITY WITH BODY MASS INDEX (BMI) OF 36.0 TO 36.9 IN ADULT, UNSPECIFIED OBESITY TYPE, UNSPECIFIED WHETHER SERIOUS COMORBIDITY PRESENT: ICD-10-CM

## 2019-10-04 DIAGNOSIS — G47.33 OSA (OBSTRUCTIVE SLEEP APNEA): Primary | ICD-10-CM

## 2019-10-04 DIAGNOSIS — G47.19 EXCESSIVE DAYTIME SLEEPINESS: ICD-10-CM

## 2019-10-04 DIAGNOSIS — R06.83 SNORES: ICD-10-CM

## 2019-10-04 PROCEDURE — 95811 POLYSOM 6/>YRS CPAP 4/> PARM: CPT | Performed by: INTERNAL MEDICINE

## 2019-10-04 PROCEDURE — 95811 POLYSOM 6/>YRS CPAP 4/> PARM: CPT

## 2019-10-05 VITALS
HEIGHT: 74 IN | SYSTOLIC BLOOD PRESSURE: 132 MMHG | DIASTOLIC BLOOD PRESSURE: 82 MMHG | OXYGEN SATURATION: 97 % | HEART RATE: 79 BPM | WEIGHT: 275 LBS | BODY MASS INDEX: 35.29 KG/M2

## 2019-10-07 ENCOUNTER — TELEPHONE (OUTPATIENT)
Dept: ADMINISTRATIVE | Age: 44
End: 2019-10-07

## 2019-10-14 ENCOUNTER — OFFICE VISIT (OUTPATIENT)
Dept: FAMILY MEDICINE CLINIC | Age: 44
End: 2019-10-14
Payer: COMMERCIAL

## 2019-10-14 VITALS
TEMPERATURE: 96.7 F | HEART RATE: 91 BPM | HEIGHT: 74 IN | SYSTOLIC BLOOD PRESSURE: 134 MMHG | DIASTOLIC BLOOD PRESSURE: 100 MMHG | OXYGEN SATURATION: 98 % | WEIGHT: 281 LBS | BODY MASS INDEX: 36.06 KG/M2

## 2019-10-14 DIAGNOSIS — S61.311D LACERATION OF LEFT INDEX FINGER WITHOUT FOREIGN BODY WITH DAMAGE TO NAIL, SUBSEQUENT ENCOUNTER: Primary | ICD-10-CM

## 2019-10-14 PROCEDURE — G8427 DOCREV CUR MEDS BY ELIG CLIN: HCPCS | Performed by: FAMILY MEDICINE

## 2019-10-14 PROCEDURE — 99211 OFF/OP EST MAY X REQ PHY/QHP: CPT | Performed by: FAMILY MEDICINE

## 2019-10-14 PROCEDURE — G8417 CALC BMI ABV UP PARAM F/U: HCPCS | Performed by: FAMILY MEDICINE

## 2019-10-14 RX ORDER — HYDROCHLOROTHIAZIDE 25 MG/1
25 TABLET ORAL DAILY
COMMUNITY
End: 2020-09-11

## 2019-10-14 RX ORDER — SPIRONOLACTONE 25 MG/1
25 TABLET ORAL DAILY
COMMUNITY
End: 2020-01-21

## 2019-10-14 RX ORDER — DILTIAZEM HYDROCHLORIDE EXTENDED-RELEASE TABLETS 180 MG/1
180 TABLET, EXTENDED RELEASE ORAL DAILY
COMMUNITY
End: 2020-09-11

## 2019-10-14 RX ORDER — DOXAZOSIN 2 MG/1
2 TABLET ORAL NIGHTLY
COMMUNITY
End: 2020-09-11 | Stop reason: SDUPTHER

## 2019-10-14 RX ORDER — HYDROCHLOROTHIAZIDE 50 MG/1
50 TABLET ORAL DAILY
COMMUNITY
End: 2020-09-11

## 2019-10-18 ENCOUNTER — TELEPHONE (OUTPATIENT)
Dept: PULMONOLOGY | Age: 44
End: 2019-10-18

## 2020-01-15 ENCOUNTER — OFFICE VISIT (OUTPATIENT)
Dept: FAMILY MEDICINE CLINIC | Age: 45
End: 2020-01-15
Payer: COMMERCIAL

## 2020-01-15 ENCOUNTER — TELEPHONE (OUTPATIENT)
Dept: FAMILY MEDICINE CLINIC | Age: 45
End: 2020-01-15

## 2020-01-15 VITALS
OXYGEN SATURATION: 98 % | WEIGHT: 292 LBS | HEIGHT: 74 IN | TEMPERATURE: 97.3 F | BODY MASS INDEX: 37.47 KG/M2 | DIASTOLIC BLOOD PRESSURE: 78 MMHG | SYSTOLIC BLOOD PRESSURE: 120 MMHG | HEART RATE: 109 BPM

## 2020-01-15 PROCEDURE — 4004F PT TOBACCO SCREEN RCVD TLK: CPT | Performed by: FAMILY MEDICINE

## 2020-01-15 PROCEDURE — G8417 CALC BMI ABV UP PARAM F/U: HCPCS | Performed by: FAMILY MEDICINE

## 2020-01-15 PROCEDURE — G8484 FLU IMMUNIZE NO ADMIN: HCPCS | Performed by: FAMILY MEDICINE

## 2020-01-15 PROCEDURE — G8427 DOCREV CUR MEDS BY ELIG CLIN: HCPCS | Performed by: FAMILY MEDICINE

## 2020-01-15 PROCEDURE — 99214 OFFICE O/P EST MOD 30 MIN: CPT | Performed by: FAMILY MEDICINE

## 2020-01-15 ASSESSMENT — PATIENT HEALTH QUESTIONNAIRE - PHQ9
SUM OF ALL RESPONSES TO PHQ9 QUESTIONS 1 & 2: 0
2. FEELING DOWN, DEPRESSED OR HOPELESS: 0
1. LITTLE INTEREST OR PLEASURE IN DOING THINGS: 0
SUM OF ALL RESPONSES TO PHQ QUESTIONS 1-9: 0
SUM OF ALL RESPONSES TO PHQ QUESTIONS 1-9: 0

## 2020-01-15 NOTE — PROGRESS NOTES
Heart Attack Mother     High Blood Pressure Mother     Heart Disease Father     Heart Attack Father     High Blood Pressure Father        Past Surgical History:   Procedure Laterality Date    CYST REMOVAL Right     Back    WISDOM TOOTH EXTRACTION         Social History     Tobacco Use    Smoking status: Never Smoker    Smokeless tobacco: Current User     Types: Chew   Substance Use Topics    Alcohol use: Yes     Comment: SOCIAL    Drug use: No       ROS:  No CP, No palpitations,   No sob, No cough,   No abd pain, No heartburn,   No headaches,   No tingling, No numbness, No weakness,   No bowel changes, No hematochezia, No melena,  No bladder changes, No hematuria  No skin rashes, No skin lesions. No vision changes, No hearing changes,   No polyuria, polydipsia, polyphagia. Stable mood. ROS otherwise negative unless as listed in HPI. Chart reviewed and updated where appropriate for PMH, Fam, and Soc Hx. Physical Exam   /78 (Site: Left Upper Arm, Position: Sitting, Cuff Size: Large Adult)   Pulse 109   Temp 97.3 °F (36.3 °C) (Temporal)   Ht 6' 2\" (1.88 m)   Wt 292 lb (132.5 kg)   SpO2 98%   BMI 37.49 kg/m²   Wt Readings from Last 3 Encounters:   01/15/20 292 lb (132.5 kg)   10/14/19 281 lb (127.5 kg)   10/05/19 275 lb (124.7 kg)       Constitutional:    He is oriented to person, place, and time. He appears well-developed and well-nourished. HENT:    Nose: Nose normal.    Mouth/Throat: Oropharynx is clear and moist.   Eyes:    Conjunctivae are normal.    Pupils are equal, round, and reactive to light. EOMI. Neck:    Normal range of motion. No thyromegaly or nodules noted. No bruit. Cardiovascular:    Normal rate, regular rhythm and normal heart sounds. No murmur. No gallop and no friction rub. Pulmonary/Chest:    Effort normal and breath sounds normal.    No wheezes. No rales or rhonchi. Abdominal:    Soft. Bowel sounds are normal.    No distension. No tenderness. to avoid motions that make the pain worse. Encouraged him to take naproxen daily to twice daily for the next week, with food for anti-inflammatory properties. If not improving, could consider injection. Gynecomastia, male  New problem. Suspect that this is a medication side effect. He is on at least 2 blood pressure pills that can cause gynecomastia including spironolactone and diltiazem. I would like to check with his nephrologist to see if we can stop spironolactone given that he is treating his obstructive sleep apnea and feeling much more rested in the morning. Additionally, his blood pressure is the best that I have seen today. Essential hypertension  Blood pressure well controlled. Sleep apnea now treated. Multidrug regimen. Hydrochlorothiazide at very high dose. Despite this no recent gout flares. Would like to stop spironolactone given gynecomastia and see if this helps. Will check with nephrology. Patient counseled to follow up sooner or seek more acute care if symptoms worsening. Electronically signed by Marizol Moore MD on 1/15/2020    This note may have been created using dictation software.  Efforts were made to reduce grammatical or syntax errors, but some may persist.

## 2020-01-15 NOTE — PATIENT INSTRUCTIONS
Patient Education        Golfer's Elbow: Care Instructions  Your Care Instructions  The pain and soreness in the inner part of your elbow is caused by a problem called golfer's elbow. Bending the wrist over and over again has hurt the tendons that attach to your inner elbow. The muscles in your forearm also may hurt. Golgiovanni's elbow usually gets better with treatment at home. Follow-up care is a key part of your treatment and safety. Be sure to make and go to all appointments, and call your doctor if you are having problems. It's also a good idea to know your test results and keep a list of the medicines you take. How can you care for yourself at home? · Rest your elbow and wrist. Try to avoid movements that are painful. You may have to do this for weeks to months. Follow your doctor's directions for how long to rest.  · Put ice or a cold pack on your elbow for 10 to 20 minutes at a time. Try to do this every 1 to 2 hours for the next 3 days (when you are awake) or until the swelling goes down. Put a thin cloth between the ice and your skin. · Prop up the sore arm on a pillow when you ice it or anytime you sit or lie down during the next 3 days. Try to keep it above the level of your heart. This will help reduce swelling. · Take pain medicine exactly as directed. ? If the doctor gave you a prescription medicine for pain, take it as prescribed. ? If you are not taking a prescription pain medicine, ask your doctor if you can take an over-the-counter medicine. · If your doctor gave you a brace or splint, use it as directed. A \"counterforce\" brace is a strap around the forearm, just below the elbow. It may ease the pressure on the tendon and may spread force throughout the arm. · Follow your doctor's or physical therapist's directions for exercise. To prevent golfer's elbow  · After your elbow has healed, learn the best techniques for your work or sport. A physical or occupational therapist can help you.   When should you call for help? Call your doctor now or seek immediate medical care if:    · Your pain gets worse.     · You cannot bend your elbow normally.     · You have tingling, weakness, or numbness in your hand and fingers.     · Your arm or hand is cool or pale or changes color.    Watch closely for changes in your health, and be sure to contact your doctor if:    · You have work problems caused by your elbow pain.     · Your pain is not better after 2 weeks. Where can you learn more? Go to https://[employer]. org and sign in to your Worksurfers account. Enter L609 in the Pheed box to learn more about \"Golfer's Elbow: Care Instructions. \"     If you do not have an account, please click on the \"Sign Up Now\" link. Current as of: June 26, 2019  Content Version: 12.3  © 9060-0324 [employer]. Care instructions adapted under license by [facility]"[employer] [facility] ([facility]). If you have questions about a medical condition or this instruction, always ask your healthcare professional. [address] any warranty or liability for your use of this information. Patient Education        Golfer's Elbow: Exercises  Introduction  Here are some examples of exercises for you to try. The exercises may be suggested for a condition or for rehabilitation. Start each exercise slowly. Ease off the exercises if you start to have pain. You will be told when to start these exercises and which ones will work best for you. How to do the exercises  Wrist extensor stretch   1. Extend your affected arm in front of you and make a fist with your palm facing down. 2. Bend your wrist so that your fist points toward the floor. 3. With your other hand, gently bend your wrist farther until you feel a mild to moderate stretch in your forearm. 4. Hold for at least 15 to 30 seconds. 5. Repeat 2 to 4 times. 6. Repeat steps 1 through 5 with your fingers pointing toward the floor.     Forearm extensor stretch   1. Place your affected elbow down at your side, bent at about 90 degrees. Then make a fist with your palm facing down. 2. Keeping your wrist bent, slowly straighten your elbow so your arm is down at your side. Then twist your fist out so your palm is facing out to the side and you feel a stretch. 3. Hold for at least 15 to 30 seconds. 4. Repeat 2 to 4 times. Wrist flexor stretch   1. Extend your affected arm in front of you with your palm facing away from your body. 2. Bend back your wrist, pointing your hand up toward the ceiling. 3. With your other hand, gently bend your wrist farther until you feel a mild to moderate stretch in your forearm. 4. Hold for at least 15 to 30 seconds. 5. Repeat 2 to 4 times. 6. Repeat steps 1 through 5, but this time extend your affected arm in front of you with your palm facing up. Then bend back your wrist, pointing your hand toward the floor. Wrist curls   1. Place your forearm on a table with your hand hanging over the edge of the table, palm up. 2. Place a 1- to 2-pound weight in your hand. This may be a dumbbell, a can of food, or a filled water bottle. 3. Slowly raise and lower the weight while keeping your forearm on the table and your palm facing up. 4. Repeat this motion 8 to 12 times. 5. Switch arms, and do steps 1 through 4.  6. Repeat with your hand facing down toward the floor. Switch arms. Resisted wrist extension   1. Sit leaning forward with your legs slightly spread. Then place your affected forearm on your thigh with your hand and wrist in front of your knee. 2. Grasp one end of an exercise band with your palm down, and step on the other end.  3. Slowly bend your wrist upward for a count of 2, then lower your wrist slowly to a count of 5.  4. Repeat 8 to 12 times. Resisted wrist flexion   1. Sit leaning forward with your legs slightly spread.  Then place your affected forearm on your thigh with your hand and wrist in front of your knee.  2. Grasp one end of an exercise band with your palm up, and step on the other end.  3. Slowly bend your wrist upward for a count of 2, then lower your wrist slowly to a count of 5.  4. Repeat 8 to 12 times. Neck stretch to the side   1. This stretch works best if you keep your shoulder down as you lean away from it. To help you remember to do this, start by relaxing your shoulders and lightly holding on to your thighs or your chair. 2. Tilt your head away from your affected elbow and toward your opposite shoulder. For example, if your right elbow is sore, keep your right shoulder down as you lean your head toward your left shoulder. 3. Hold for 15 to 30 seconds. Let the weight of your head stretch your muscles. 4. If you would like a little added stretch, use your hand to gently and steadily pull your head toward your shoulder. For example, if your right elbow is sore, use your left hand to gently pull your head toward your left shoulder. 5. Repeat 2 to 4 times. Resisted forearm pronation   1. Sit leaning forward with your legs slightly spread. Then place your affected forearm on your thigh with your hand and wrist in front of your knee. 2. Grasp one end of an exercise band with your palm up, and step on the other end. 3. Keeping your wrist straight, roll your palm inward toward your thigh for a count of 2, then slowly move your wrist back to the starting position to a count of 5.  4. Repeat 8 to 12 times. Resisted supination   1. Sit leaning forward with your legs slightly spread. Then place your affected forearm on your thigh with your hand and wrist in front of your knee. 2. Grasp one end of an exercise band with your palm down, and step on the other end. 3. Keeping your wrist straight, roll your palm outward and away from your thigh for a count of 2, then slowly move your wrist back to the starting position to a count of 5.  4. Repeat 8 to 12 times.     Follow-up care is a key part of your treatment and safety. Be sure to make and go to all appointments, and call your doctor if you are having problems. It's also a good idea to know your test results and keep a list of the medicines you take. Where can you learn more? Go to https://chrachael.[employer]. org and sign in to your [employer] account. Enter [phone] in the Appwiz box to learn more about \"Golfer's Elbow: Exercises. \"     If you do not have an account, please click on the \"Sign Up Now\" link. Current as of: June 26, 2019  Content Version: 12.3  © 7952-1573 [employer]. Care instructions adapted under license by [facility] ([facility]). If you have questions about a medical condition or this instruction, always ask your healthcare professional. [address] any warranty or liability for your use of this information.

## 2020-01-15 NOTE — TELEPHONE ENCOUNTER
Please call patient's nephrologist, Dr. Richa Chaidez. I would like to know if he has objection to me stopping spironolactone. Blood pressure in office today was 120/74. Patient feeling much better/well rested in the morning with use of CPAP. Patient has developed left side gynecomastia and I am suspecting this is probably medication side effect. Spironolactone and diltiazem both have this as a possible listed side effect. He has follow-up with nephrology in a few weeks it sounds like.

## 2020-01-21 NOTE — TELEPHONE ENCOUNTER
Allyson at 's office left  to return Marcelle's call. She states they spoke with  since [facility] was out of town.  said it was ok to discontinue the Aldactone for the listed reason. She said if the office has any further questions to call her office and she will be at her desk for the rest of the day.   Thanks

## 2020-09-11 ENCOUNTER — OFFICE VISIT (OUTPATIENT)
Dept: FAMILY MEDICINE CLINIC | Age: 45
End: 2020-09-11
Payer: COMMERCIAL

## 2020-09-11 VITALS
WEIGHT: 285 LBS | SYSTOLIC BLOOD PRESSURE: 142 MMHG | OXYGEN SATURATION: 98 % | DIASTOLIC BLOOD PRESSURE: 88 MMHG | BODY MASS INDEX: 36.57 KG/M2 | HEART RATE: 80 BPM | HEIGHT: 74 IN | TEMPERATURE: 96.6 F

## 2020-09-11 PROCEDURE — 99213 OFFICE O/P EST LOW 20 MIN: CPT | Performed by: FAMILY MEDICINE

## 2020-09-11 PROCEDURE — 4004F PT TOBACCO SCREEN RCVD TLK: CPT | Performed by: FAMILY MEDICINE

## 2020-09-11 PROCEDURE — G8427 DOCREV CUR MEDS BY ELIG CLIN: HCPCS | Performed by: FAMILY MEDICINE

## 2020-09-11 PROCEDURE — G8417 CALC BMI ABV UP PARAM F/U: HCPCS | Performed by: FAMILY MEDICINE

## 2020-09-11 RX ORDER — ALLOPURINOL 300 MG/1
TABLET ORAL
Qty: 30 TABLET | Refills: 5 | Status: SHIPPED
Start: 2020-09-11 | End: 2021-01-21 | Stop reason: SDUPTHER

## 2020-09-11 RX ORDER — HYDRALAZINE HYDROCHLORIDE 25 MG/1
25 TABLET, FILM COATED ORAL 3 TIMES DAILY
Qty: 90 TABLET | Refills: 1 | Status: SHIPPED
Start: 2020-09-11 | End: 2020-12-01 | Stop reason: SDUPTHER

## 2020-09-11 RX ORDER — HYDRALAZINE HYDROCHLORIDE 50 MG/1
50 TABLET, FILM COATED ORAL 3 TIMES DAILY
COMMUNITY
End: 2020-09-11 | Stop reason: SDUPTHER

## 2020-09-11 RX ORDER — HYDRALAZINE HYDROCHLORIDE 50 MG/1
50 TABLET, FILM COATED ORAL 3 TIMES DAILY
Qty: 90 TABLET | Refills: 1 | Status: SHIPPED
Start: 2020-09-11 | End: 2020-12-01 | Stop reason: SDUPTHER

## 2020-09-11 RX ORDER — HYDRALAZINE HYDROCHLORIDE 25 MG/1
25 TABLET, FILM COATED ORAL 3 TIMES DAILY
COMMUNITY
End: 2020-09-11 | Stop reason: SDUPTHER

## 2020-09-11 RX ORDER — DOXAZOSIN 2 MG/1
2 TABLET ORAL 2 TIMES DAILY
Qty: 30 TABLET | Refills: 2 | Status: SHIPPED
Start: 2020-09-11 | End: 2020-12-21 | Stop reason: SDUPTHER

## 2020-09-11 NOTE — Clinical Note
Need to have patient come back a week or two after has been  on doxazosin for BP check to record him at goal. Nursing visit.

## 2020-09-11 NOTE — PROGRESS NOTES
[facility]  Office Progress Note - Dr. Shavonne Cruz  9/11/20    Chief Complaint   Patient presents with    Annual Exam        HPI:   Hypertension  Follow-up  Blood pressure is not controlled today. BP Readings from Last 3 Encounters:   09/11/20 (!) 142/88   01/15/20 120/78   10/14/19 (!) 134/100   recheck same / similar. Has bene out of doxazosin for a week. Continues hydralazine 75mg TID. Tx sleep apnea regularly. Feels better, knows when he doesn't use. Patient continues medications regularly. Compliance is good. Denies CP, sob, abd pain, headaches, vision changes, dizziness, hypotensive symptoms. No side effects from medications noted. gynecomastia resolved off spironolactone. In between nephro docs right now. DUSTY  Continues cpap regularly. Feels better when uses. Uses majority of night. No problems. No excess sedation. BP med reliance has decreased. No gouty flares with current dose of allopurinol.     ______________________________________________________  Family History   Problem Relation Age of Onset    Heart Disease Mother     Heart Attack Mother     High Blood Pressure Mother     Heart Disease Father     Heart Attack Father     High Blood Pressure Father        Past Surgical History:   Procedure Laterality Date    CYST REMOVAL Right     Back    WISDOM TOOTH EXTRACTION         Social History     Tobacco Use    Smoking status: Never Smoker    Smokeless tobacco: Current User     Types: Chew   Substance Use Topics    Alcohol use: Yes     Comment: SOCIAL    Drug use: No     ______________________________________________________  ROS: POSITIVE:  Otherwise:  No CP, No palpitations,   No sob, No cough,   No abd pain, No heartburn,   No headaches, No vision changes, No hearing changes,   No tingling, No numbness, No weakness,   No bowel changes, No hematochezia, No melena,  No bladder changes, No hematuria  No skin rashes, No skin lesions.   No polyuria, polydipsia, polyphagia. Stable mood. ROS otherwise negative unless as listed in HPI. Chart reviewed and updated where appropriate for PMH, Fam, and Soc Hx.  _______________________________________________________  Physical Exam   BP (!) 142/88 (Site: Right Upper Arm, Position: Sitting, Cuff Size: Large Adult)   Pulse 80   Temp 96.6 °F (35.9 °C) (Temporal)   Ht 6' 2\" (1.88 m)   Wt 285 lb (129.3 kg)   SpO2 98%   BMI 36.59 kg/m²   Wt Readings from Last 3 Encounters:   09/11/20 285 lb (129.3 kg)   01/15/20 292 lb (132.5 kg)   10/14/19 281 lb (127.5 kg)       Constitutional:    He is oriented to person, place, and time. He appears well-developed and well-nourished. HENT:    Nose: Nose normal.    Mouth/Throat: Oropharynx is clear and moist.   Eyes:    Conjunctivae are normal.    Pupils are equal, round, and reactive to light. EOMI. Neck:    Normal range of motion. No thyromegaly or nodules noted. No bruit. Cardiovascular:    Normal rate, regular rhythm and normal heart sounds. No murmur. No gallop and no friction rub. Pulmonary/Chest:    Effort normal and breath sounds normal.    No wheezes. No rales or rhonchi. Abdominal:    Soft. Bowel sounds are normal.    No distension. No tenderness. Skin:    Skin is warm and dry. No rashes, lesions. Psychiatric:    He has a normal mood and affect. Normal groom and dress. No SI or HI.   ________________________________________________________  No current outpatient medications on file prior to visit. No current facility-administered medications on file prior to visit.         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    Diagnoses and all orders for this visit:    Essential hypertension  -     doxazosin (CARDURA) 2 MG tablet; Take 1 tablet by mouth 2 times daily bid  -     hydrALAZINE (APRESOLINE) 25 MG tablet; Take 1 tablet by mouth 3 times daily  -     hydrALAZINE (APRESOLINE) 50 MG tablet; Take 1 tablet by mouth 3 times daily  Boredline, better controlled at home - has bene out of doxazosin for a week. Get back on and recheck BP to assure at goal.   Est with new nephrologist.  Last notes from Dr. Sarthak Gandhi reviewed. No need for changes. Idiopathic chronic gout of foot without tophus, unspecified laterality  -     allopurinol (ZYLOPRIM) 300 MG tablet; TAKE ONE TABLET BY MOUTH ONCE DAILY FOR GOUT PREVENTION  Controlled, stable. Severe obstructive sleep apnea  Improved, stable  Continues cpap use regularly for the whole night. BP meds able to be decreased. Return in about 6 months (around 3/11/2021). Patient counseled to follow up sooner or seek more acute care if symptoms worsening or not improving according to plan. .     Electronically signed by Aly Portillo MD on 9/11/2020    This note may have been created using dictation software.  Efforts were made to reduce grammatical or syntax errors, but some may persist.

## 2020-09-14 ENCOUNTER — TELEPHONE (OUTPATIENT)
Dept: FAMILY MEDICINE CLINIC | Age: 45
End: 2020-09-14

## 2020-09-14 NOTE — TELEPHONE ENCOUNTER
----- Message from Marylee Barges, MD sent at 9/11/2020  6:03 PM EDT -----  Need to have patient come back a week or two after has been  on doxazosin for BP check to record him at goal. Nursing visit.

## 2020-12-01 RX ORDER — HYDRALAZINE HYDROCHLORIDE 25 MG/1
25 TABLET, FILM COATED ORAL 3 TIMES DAILY
Qty: 270 TABLET | Refills: 1 | Status: SHIPPED
Start: 2020-12-01 | End: 2021-02-24 | Stop reason: SDUPTHER

## 2020-12-01 RX ORDER — HYDRALAZINE HYDROCHLORIDE 50 MG/1
50 TABLET, FILM COATED ORAL 3 TIMES DAILY
Qty: 270 TABLET | Refills: 1 | Status: SHIPPED
Start: 2020-12-01 | End: 2021-02-24 | Stop reason: SDUPTHER

## 2020-12-22 RX ORDER — DOXAZOSIN 2 MG/1
2 TABLET ORAL 2 TIMES DAILY
Qty: 30 TABLET | Refills: 5 | Status: SHIPPED
Start: 2020-12-22 | End: 2021-01-21 | Stop reason: SDUPTHER

## 2020-12-22 NOTE — TELEPHONE ENCOUNTER
Last Appointment:  9/11/2020  Future Appointments   Date Time Provider Wesly Saini   3/11/2021  3:00 PM Esther Correa  [address] [place] [address]

## 2021-01-21 DIAGNOSIS — M1A.0790 IDIOPATHIC CHRONIC GOUT OF FOOT WITHOUT TOPHUS, UNSPECIFIED LATERALITY: ICD-10-CM

## 2021-01-21 DIAGNOSIS — I10 ESSENTIAL HYPERTENSION: ICD-10-CM

## 2021-01-22 ENCOUNTER — PATIENT MESSAGE (OUTPATIENT)
Dept: FAMILY MEDICINE CLINIC | Age: 46
End: 2021-01-22

## 2021-01-22 RX ORDER — ALLOPURINOL 300 MG/1
TABLET ORAL
Qty: 30 TABLET | Refills: 5 | Status: SHIPPED
Start: 2021-01-22 | End: 2021-07-29 | Stop reason: SDUPTHER

## 2021-01-22 RX ORDER — DOXAZOSIN 2 MG/1
2 TABLET ORAL 2 TIMES DAILY
Qty: 30 TABLET | Refills: 5 | Status: SHIPPED
Start: 2021-01-22 | End: 2021-07-29 | Stop reason: SDUPTHER

## 2021-01-22 RX ORDER — PREDNISONE 50 MG/1
50 TABLET ORAL DAILY
Qty: 5 TABLET | Refills: 0 | Status: SHIPPED
Start: 2021-01-22 | End: 2021-02-12 | Stop reason: SDUPTHER

## 2021-01-22 NOTE — TELEPHONE ENCOUNTER
Last Appointment:  9/11/2020  Future Appointments   Date Time Provider Wesly Saini   3/11/2021  3:00 PM Maikel Hernandez  [address] [place] [address]

## 2021-01-22 NOTE — TELEPHONE ENCOUNTER
From: Stevan Ugarte  To: John Fernandez MD  Sent: 1/22/2021 6:40 AM EST  Subject: Non-Urgent Esdras Fair Dr Rachel Mata   I think I might be having a flare up of my gout but this time it seems to be in my left knee not the toes and ankle like normal. It came on quick last evening and kept me up all night. It kills me to weight bear on my leg and I had to start using crutches. My leg aches from my knee down and hurts to bend and I cant extend my knee fully. I have no other symptoms other than this knee pain. Any way you can see me today by E-visit because I dont think I will be able to drive there and walking is very difficult. or if you think its appropriate call me in an anti inflammatory/gout medicine.    [address]

## 2021-02-11 ENCOUNTER — PATIENT MESSAGE (OUTPATIENT)
Dept: FAMILY MEDICINE CLINIC | Age: 46
End: 2021-02-11

## 2021-02-12 RX ORDER — PREDNISONE 50 MG/1
50 TABLET ORAL DAILY
Qty: 5 TABLET | Refills: 0 | Status: SHIPPED | OUTPATIENT
Start: 2021-02-12 | End: 2021-02-17

## 2021-02-24 DIAGNOSIS — I10 ESSENTIAL HYPERTENSION: ICD-10-CM

## 2021-02-24 NOTE — TELEPHONE ENCOUNTER
Last Appointment:  9/11/2020  Future Appointments   Date Time Provider Wesly Saini   3/11/2021  3:00 PM Marko Rushing  [address] [place] [address]

## 2021-02-25 RX ORDER — HYDRALAZINE HYDROCHLORIDE 25 MG/1
25 TABLET, FILM COATED ORAL 3 TIMES DAILY
Qty: 270 TABLET | Refills: 1 | Status: SHIPPED
Start: 2021-02-25 | End: 2021-08-26 | Stop reason: SDUPTHER

## 2021-02-25 RX ORDER — HYDRALAZINE HYDROCHLORIDE 50 MG/1
50 TABLET, FILM COATED ORAL 3 TIMES DAILY
Qty: 270 TABLET | Refills: 1 | Status: SHIPPED
Start: 2021-02-25 | End: 2021-08-26 | Stop reason: SDUPTHER

## 2021-03-11 ENCOUNTER — OFFICE VISIT (OUTPATIENT)
Dept: FAMILY MEDICINE CLINIC | Age: 46
End: 2021-03-11
Payer: COMMERCIAL

## 2021-03-11 VITALS
OXYGEN SATURATION: 98 % | WEIGHT: 290 LBS | TEMPERATURE: 97.3 F | BODY MASS INDEX: 37.22 KG/M2 | SYSTOLIC BLOOD PRESSURE: 130 MMHG | HEIGHT: 74 IN | HEART RATE: 82 BPM | DIASTOLIC BLOOD PRESSURE: 86 MMHG

## 2021-03-11 DIAGNOSIS — I10 ESSENTIAL HYPERTENSION: Primary | ICD-10-CM

## 2021-03-11 DIAGNOSIS — E78.00 ELEVATED CHOLESTEROL: ICD-10-CM

## 2021-03-11 DIAGNOSIS — G47.33 OBSTRUCTIVE SLEEP APNEA: ICD-10-CM

## 2021-03-11 PROCEDURE — G8484 FLU IMMUNIZE NO ADMIN: HCPCS | Performed by: FAMILY MEDICINE

## 2021-03-11 PROCEDURE — G8427 DOCREV CUR MEDS BY ELIG CLIN: HCPCS | Performed by: FAMILY MEDICINE

## 2021-03-11 PROCEDURE — 99213 OFFICE O/P EST LOW 20 MIN: CPT | Performed by: FAMILY MEDICINE

## 2021-03-11 PROCEDURE — 4004F PT TOBACCO SCREEN RCVD TLK: CPT | Performed by: FAMILY MEDICINE

## 2021-03-11 PROCEDURE — G8417 CALC BMI ABV UP PARAM F/U: HCPCS | Performed by: FAMILY MEDICINE

## 2021-03-11 ASSESSMENT — PATIENT HEALTH QUESTIONNAIRE - PHQ9
1. LITTLE INTEREST OR PLEASURE IN DOING THINGS: 0
SUM OF ALL RESPONSES TO PHQ QUESTIONS 1-9: 0
SUM OF ALL RESPONSES TO PHQ QUESTIONS 1-9: 0
2. FEELING DOWN, DEPRESSED OR HOPELESS: 0

## 2021-04-16 LAB
CHOLESTEROL: 212 MG/DL (ref 140–200)
HDLC SERPL-MCNC: 51 MG/DL (ref 29–71)
LDL CHOLESTEROL: 147 MG/DL
LDL/HDL RATIO: 2.9 RATIO
TRIGL SERPL-MCNC: 88 MG/DL (ref 41–189)

## 2021-07-29 DIAGNOSIS — I10 ESSENTIAL HYPERTENSION: ICD-10-CM

## 2021-07-29 DIAGNOSIS — M1A.0790 IDIOPATHIC CHRONIC GOUT OF FOOT WITHOUT TOPHUS, UNSPECIFIED LATERALITY: ICD-10-CM

## 2021-07-29 NOTE — TELEPHONE ENCOUNTER
Last Appointment:  3/11/2021  Future Appointments   Date Time Provider Wesly Kearnsi   9/14/2021  3:00 PM Esther Correa  [address] [place] [address]

## 2021-07-31 RX ORDER — DOXAZOSIN 2 MG/1
2 TABLET ORAL 2 TIMES DAILY
Qty: 30 TABLET | Refills: 5 | Status: SHIPPED
Start: 2021-07-31 | End: 2022-02-11 | Stop reason: SDUPTHER

## 2021-07-31 RX ORDER — ALLOPURINOL 300 MG/1
TABLET ORAL
Qty: 30 TABLET | Refills: 5 | Status: SHIPPED
Start: 2021-07-31 | End: 2022-02-11 | Stop reason: SDUPTHER

## 2021-08-26 DIAGNOSIS — I10 ESSENTIAL HYPERTENSION: ICD-10-CM

## 2021-08-26 RX ORDER — HYDRALAZINE HYDROCHLORIDE 25 MG/1
25 TABLET, FILM COATED ORAL 3 TIMES DAILY
Qty: 270 TABLET | Refills: 1 | Status: SHIPPED
Start: 2021-08-26 | End: 2022-03-15 | Stop reason: SDUPTHER

## 2021-08-26 RX ORDER — HYDRALAZINE HYDROCHLORIDE 50 MG/1
50 TABLET, FILM COATED ORAL 3 TIMES DAILY
Qty: 270 TABLET | Refills: 1 | Status: SHIPPED
Start: 2021-08-26 | End: 2022-03-15 | Stop reason: SDUPTHER

## 2021-09-14 ENCOUNTER — OFFICE VISIT (OUTPATIENT)
Dept: FAMILY MEDICINE CLINIC | Age: 46
End: 2021-09-14
Payer: COMMERCIAL

## 2021-09-14 VITALS
WEIGHT: 290.6 LBS | HEIGHT: 74 IN | SYSTOLIC BLOOD PRESSURE: 134 MMHG | TEMPERATURE: 98 F | OXYGEN SATURATION: 98 % | HEART RATE: 93 BPM | BODY MASS INDEX: 37.29 KG/M2 | DIASTOLIC BLOOD PRESSURE: 78 MMHG | RESPIRATION RATE: 16 BRPM

## 2021-09-14 DIAGNOSIS — I10 ESSENTIAL HYPERTENSION: Primary | ICD-10-CM

## 2021-09-14 DIAGNOSIS — M79.642 LEFT HAND PAIN: ICD-10-CM

## 2021-09-14 PROCEDURE — 4004F PT TOBACCO SCREEN RCVD TLK: CPT | Performed by: FAMILY MEDICINE

## 2021-09-14 PROCEDURE — G8417 CALC BMI ABV UP PARAM F/U: HCPCS | Performed by: FAMILY MEDICINE

## 2021-09-14 PROCEDURE — G8427 DOCREV CUR MEDS BY ELIG CLIN: HCPCS | Performed by: FAMILY MEDICINE

## 2021-09-14 PROCEDURE — 99213 OFFICE O/P EST LOW 20 MIN: CPT | Performed by: FAMILY MEDICINE

## 2021-09-14 NOTE — PROGRESS NOTES
[facility]  Office Progress Note - Dr. Dari Garcia  9/14/21    CC:   Chief Complaint   Patient presents with    Hypertension        /78   Pulse 93   Temp 98 °F (36.7 °C) (Temporal)   Resp 16   Ht 6' 2\" (1.88 m)   Wt 290 lb 9.6 oz (131.8 kg)   SpO2 98%   BMI 37.31 kg/m²   Wt Readings from Last 3 Encounters:   09/14/21 290 lb 9.6 oz (131.8 kg)   03/11/21 290 lb (131.5 kg)   09/11/20 285 lb (129.3 kg)       HPI:   Hypertension  Follow-up  Blood pressure is controlled today. BP Readings from Last 3 Encounters:   09/14/21 134/78   03/11/21 130/86   09/11/20 (!) 142/88   mornings usually 130/70s, evenings usually 140s/80s. Every once in a while up to 90. Patient continues medications regularly. Compliance is good. Denies CP, sob, abd pain, headaches, vision changes, dizziness, hypotensive symptoms. No side effects from medications noted. Fingers hurting recently. Left hand only. MCP joints. Maybe swelling, noting tightness. 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 He got ahold of pulm and advisd to use a double filter until he is sent a new unit.          _________________________________________________________    Assessment / Stefan Fonseca was seen today for hypertension. Diagnoses and all orders for this visit:    Essential hypertension  At goal on recheck. Continue same medication regimen. Continue treating obstructive sleep apnea  Work on weight loss efforts. 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  Not sounding like rheumatoid or other inflammatory arthritis so we will continue to monitor this for now. Recommended Tylenol or Aleve if needed. Return in about 6 months (around 3/14/2022). or as scheduled. Patient counseled to follow up sooner or seek more acute care if symptoms worsening or not improving according to plan.      Electronically signed by Tyrese Gupta Stacie Godoy MD on 9/14/2021    _________________________________________________________  Current Outpatient Medications on File Prior to Visit   Medication Sig Dispense Refill    hydrALAZINE (APRESOLINE) 25 MG tablet Take 1 tablet by mouth 3 times daily 270 tablet 1    hydrALAZINE (APRESOLINE) 50 MG tablet Take 1 tablet by mouth 3 times daily 270 tablet 1    allopurinol (ZYLOPRIM) 300 MG tablet TAKE ONE TABLET BY MOUTH ONCE DAILY FOR GOUT PREVENTION 30 tablet 5    doxazosin (CARDURA) 2 MG tablet Take 1 tablet by mouth 2 times daily bid 30 tablet 5     No current facility-administered medications on file prior to visit. 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     _________________________________________________________  Past Medical History:   Diagnosis Date    Hyperlipidemia     Hypertension        Family History   Problem Relation Age of Onset    Heart Disease Mother     Heart Attack Mother     High Blood Pressure Mother     Heart Disease Father     Heart Attack Father     High Blood Pressure Father        Past Surgical History:   Procedure Laterality Date    CYST REMOVAL Right     Back    WISDOM TOOTH EXTRACTION         Social History     Tobacco Use    Smoking status: Never Smoker    Smokeless tobacco: Current User     Types: Chew   Substance Use Topics    Alcohol use: Yes     Comment: SOCIAL    Drug use: No       Chart reviewed and updated where appropriate for PMH, Fam, and Soc Hx.  _________________________________________________________  ROS: POSITIVE: As in the HPI. Otherwise Pertinent negatives are negative.    __________________________________________________________  Physical Exam   Constitutional:    He is oriented to person, place, and time. He appears well-developed and well-nourished.    HENT:    Right Ear: normal pinna, normal canal, normal TM. Left Ear: normal pinna, normal canal, normal TM. Nose: Nose normal.    Mouth/Throat: Oropharynx is clear and moist.   Eyes:    Conjunctivae are normal.    Pupils are equal, round, and reactive to light. EOMI. Neck:    Normal range of motion. No thyromegaly or nodules noted. No bruit. No LAD. Cardiovascular:    Normal rate, regular rhythm and normal heart sounds. No murmur. No gallop and no friction rub. Pulmonary/Chest:    Effort normal and breath sounds normal.    No wheezes. No rales or rhonchi. Abdominal:    Soft. Bowel sounds are normal.    No distension. No tenderness. Musculoskeletal:    Normal range of motion. No joint swelling noted. No peripheral edema. Neurological:    He is A&Ox3. Motor and sensation grossly intact. Normal Gait. Skin:    Skin is warm and dry. No rashes, lesions. Psychiatric:    He has a normal mood and affect. Normal groom and dress. No SI or HI.   ________________________________________________________    This note may have been created using dictation software.  Efforts were made to reduce errors, but some may persist.

## 2022-02-11 DIAGNOSIS — I10 ESSENTIAL HYPERTENSION: ICD-10-CM

## 2022-02-11 DIAGNOSIS — M1A.0790 IDIOPATHIC CHRONIC GOUT OF FOOT WITHOUT TOPHUS, UNSPECIFIED LATERALITY: ICD-10-CM

## 2022-02-11 RX ORDER — DOXAZOSIN 2 MG/1
2 TABLET ORAL 2 TIMES DAILY
Qty: 30 TABLET | Refills: 5 | Status: SHIPPED
Start: 2022-02-11 | End: 2022-09-12 | Stop reason: SDUPTHER

## 2022-02-11 RX ORDER — ALLOPURINOL 300 MG/1
TABLET ORAL
Qty: 30 TABLET | Refills: 5 | Status: SHIPPED
Start: 2022-02-11 | End: 2022-09-12 | Stop reason: SDUPTHER

## 2022-02-11 NOTE — TELEPHONE ENCOUNTER
Last Appointment:  9/14/2021  Future Appointments   Date Time Provider Wesly Saini   3/15/2022  3:40 PM Rebeka Mendoza  [address] [place] [address]

## 2022-03-15 ENCOUNTER — OFFICE VISIT (OUTPATIENT)
Dept: FAMILY MEDICINE CLINIC | Age: 47
End: 2022-03-15
Payer: COMMERCIAL

## 2022-03-15 VITALS
SYSTOLIC BLOOD PRESSURE: 134 MMHG | TEMPERATURE: 97.5 F | BODY MASS INDEX: 37.73 KG/M2 | OXYGEN SATURATION: 98 % | HEIGHT: 74 IN | DIASTOLIC BLOOD PRESSURE: 84 MMHG | HEART RATE: 92 BPM | WEIGHT: 294 LBS

## 2022-03-15 DIAGNOSIS — I10 ESSENTIAL HYPERTENSION: ICD-10-CM

## 2022-03-15 PROCEDURE — G8417 CALC BMI ABV UP PARAM F/U: HCPCS | Performed by: FAMILY MEDICINE

## 2022-03-15 PROCEDURE — 99213 OFFICE O/P EST LOW 20 MIN: CPT | Performed by: FAMILY MEDICINE

## 2022-03-15 PROCEDURE — G8427 DOCREV CUR MEDS BY ELIG CLIN: HCPCS | Performed by: FAMILY MEDICINE

## 2022-03-15 PROCEDURE — 4004F PT TOBACCO SCREEN RCVD TLK: CPT | Performed by: FAMILY MEDICINE

## 2022-03-15 PROCEDURE — G8484 FLU IMMUNIZE NO ADMIN: HCPCS | Performed by: FAMILY MEDICINE

## 2022-03-15 RX ORDER — HYDRALAZINE HYDROCHLORIDE 25 MG/1
25 TABLET, FILM COATED ORAL 3 TIMES DAILY
Qty: 270 TABLET | Refills: 1 | Status: SHIPPED
Start: 2022-03-15 | End: 2022-09-15 | Stop reason: SDUPTHER

## 2022-03-15 RX ORDER — TRIAMCINOLONE ACETONIDE 1 MG/G
CREAM TOPICAL
Qty: 45 G | Refills: 0 | Status: SHIPPED | OUTPATIENT
Start: 2022-03-15

## 2022-03-15 RX ORDER — HYDRALAZINE HYDROCHLORIDE 50 MG/1
50 TABLET, FILM COATED ORAL 3 TIMES DAILY
Qty: 270 TABLET | Refills: 1 | Status: SHIPPED
Start: 2022-03-15 | End: 2022-09-15 | Stop reason: SDUPTHER

## 2022-03-15 ASSESSMENT — PATIENT HEALTH QUESTIONNAIRE - PHQ9
SUM OF ALL RESPONSES TO PHQ QUESTIONS 1-9: 0
SUM OF ALL RESPONSES TO PHQ9 QUESTIONS 1 & 2: 0
SUM OF ALL RESPONSES TO PHQ QUESTIONS 1-9: 0
1. LITTLE INTEREST OR PLEASURE IN DOING THINGS: 0
SUM OF ALL RESPONSES TO PHQ QUESTIONS 1-9: 0
SUM OF ALL RESPONSES TO PHQ QUESTIONS 1-9: 0
2. FEELING DOWN, DEPRESSED OR HOPELESS: 0

## 2022-03-15 NOTE — PROGRESS NOTES
[facility]  Office Progress Note - Dr. Alvaro Meneses  3/15/22    CC:   Chief Complaint   Patient presents with    Hypertension        /84   Pulse 92   Temp 97.5 °F (36.4 °C) (Temporal)   Ht 6' 2\" (1.88 m)   Wt 294 lb (133.4 kg)   SpO2 98%   BMI 37.75 kg/m²   Wt Readings from Last 3 Encounters:   03/15/22 294 lb (133.4 kg)   09/14/21 290 lb 9.6 oz (131.8 kg)   03/11/21 290 lb (131.5 kg)       HPI:   Hypertension  Follow-up  Blood pressure is controlled today. BP Readings from Last 3 Encounters:   03/15/22 134/84   09/14/21 134/78   03/11/21 130/86   seeing borderline readings sometimes, 120s/70s other times. Usually runs higher a little later in the day. Before his final pill dose. Patient continues medications regularly. Compliance is good. Denies CP, sob, abd pain, headaches, vision changes, dizziness, hypotensive symptoms. No side effects from medications noted. Continues with CPAP machine nightly.     _________________________________________________________    Assessment / Naty Shoulder was seen today for hypertension. Diagnoses and all orders for this visit:    Essential hypertension  -     hydrALAZINE (APRESOLINE) 25 MG tablet; Take 1 tablet by mouth 3 times daily  -     hydrALAZINE (APRESOLINE) 50 MG tablet; Take 1 tablet by mouth 3 times daily  -     CBC with Auto Differential; Future  -     Comprehensive Metabolic Panel; Future  -     Lipid Panel; Future  Blood pressure controlled on recheck. Most home readings are normal.  We discussed some strategies to improve adherence with 3 times daily dosing. Encouraged him to put a bottle of hydralazine in his lunch box so that he has it every day. Other orders  -     triamcinolone (KENALOG) 0.1 % cream; Apply topically 1-2 times daily to rash areas. Dry skin dermatitis with mild eczematous changes. Emphasized hydration in addition to using triamcinolone for spot treatment.     Return in about 6 months (around 9/15/2022). or as scheduled. Patient counseled to follow up sooner or seek more acute care if symptoms worsening or not improving according to plan. Electronically signed by Allie Yi MD on 3/15/2022    _________________________________________________________  Current Outpatient Medications on File Prior to Visit   Medication Sig Dispense Refill    allopurinol (ZYLOPRIM) 300 MG tablet TAKE ONE TABLET BY MOUTH ONCE DAILY FOR GOUT PREVENTION 30 tablet 5    doxazosin (CARDURA) 2 MG tablet Take 1 tablet by mouth 2 times daily bid 30 tablet 5     No current facility-administered medications on file prior to visit. 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     _________________________________________________________  Past Medical History:   Diagnosis Date    Hyperlipidemia     Hypertension        Family History   Problem Relation Age of Onset    Heart Disease Mother     Heart Attack Mother     High Blood Pressure Mother     Heart Disease Father     Heart Attack Father     High Blood Pressure Father        Past Surgical History:   Procedure Laterality Date    CYST REMOVAL Right     Back    WISDOM TOOTH EXTRACTION         Social History     Tobacco Use    Smoking status: Never Smoker    Smokeless tobacco: Current User     Types: Chew   Substance Use Topics    Alcohol use: Yes     Comment: SOCIAL    Drug use: No       Chart reviewed and updated where appropriate for PMH, Fam, and Soc Hx.  _________________________________________________________  ROS: POSITIVE: As in the HPI. Otherwise Pertinent negatives are negative.    __________________________________________________________  Physical Exam   Constitutional:    He is oriented to person, place, and time. He appears well-developed and well-nourished.    Eyes:    Conjunctivae are normal.    Pupils are equal, round, and reactive to light. EOMI. Neck:    Normal range of motion. No thyromegaly or nodules noted. No bruit. No LAD. Cardiovascular:    Normal rate, regular rhythm and normal heart sounds. No murmur. No gallop and no friction rub. Pulmonary/Chest:    Effort normal and breath sounds normal.    No wheezes. No rales or rhonchi. Abdominal:    Soft. Bowel sounds are normal.    No distension. No tenderness. Musculoskeletal:    Normal range of motion. No joint swelling noted. No peripheral edema. Neurological:   Skin:    Skin is warm and dry. No rashes, lesions. Dry skin dermatitis changes on the legs. Psychiatric:    He has a normal mood and affect. Normal groom and dress. No SI or HI.   ________________________________________________________    This note may have been created using dictation software.  Efforts were made to reduce errors, but some may persist.

## 2022-09-07 DIAGNOSIS — I10 ESSENTIAL HYPERTENSION: ICD-10-CM

## 2022-09-07 DIAGNOSIS — M1A.0790 IDIOPATHIC CHRONIC GOUT OF FOOT WITHOUT TOPHUS, UNSPECIFIED LATERALITY: ICD-10-CM

## 2022-09-07 RX ORDER — DOXAZOSIN 2 MG/1
2 TABLET ORAL 2 TIMES DAILY
Qty: 30 TABLET | Refills: 5 | Status: CANCELLED | OUTPATIENT
Start: 2022-09-07

## 2022-09-07 RX ORDER — ALLOPURINOL 300 MG/1
TABLET ORAL
Qty: 30 TABLET | Refills: 5 | Status: CANCELLED | OUTPATIENT
Start: 2022-09-07

## 2022-09-09 ENCOUNTER — PATIENT MESSAGE (OUTPATIENT)
Dept: FAMILY MEDICINE CLINIC | Age: 47
End: 2022-09-09

## 2022-09-09 DIAGNOSIS — M1A.0790 IDIOPATHIC CHRONIC GOUT OF FOOT WITHOUT TOPHUS, UNSPECIFIED LATERALITY: ICD-10-CM

## 2022-09-09 DIAGNOSIS — I10 ESSENTIAL HYPERTENSION: ICD-10-CM

## 2022-09-09 RX ORDER — DOXAZOSIN 2 MG/1
2 TABLET ORAL 2 TIMES DAILY
Qty: 30 TABLET | Refills: 5 | OUTPATIENT
Start: 2022-09-09

## 2022-09-09 RX ORDER — ALLOPURINOL 300 MG/1
TABLET ORAL
Qty: 30 TABLET | Refills: 5 | OUTPATIENT
Start: 2022-09-09

## 2022-09-09 NOTE — TELEPHONE ENCOUNTER
Patient is requesting refills:             allopurinol (ZYLOPRIM) 300 MG tablet [Dr. Jeanne Perez MD]         doxazosin (CARDURA) 2 MG tablet [Dr. Jeanne Perez MD]      Patient Comment: 60 tablets 30 days      Preferred pharmacy: [facility] [phone] - F [phone]

## 2022-09-09 NOTE — TELEPHONE ENCOUNTER
Last Appointment:  3/15/2022  Future Appointments   Date Time Provider Wesly Saini   9/15/2022  2:40 PM Kevin Banda  [address] [facility] [address]

## 2022-09-09 NOTE — TELEPHONE ENCOUNTER
From: Nellie Hernandes  To: Dr. Johanna Washington  Sent: 9/9/2022 12:23 PM EDT  Subject: Refill verification     Was just making sure that you received my refill medication request ?

## 2022-09-12 RX ORDER — DOXAZOSIN 2 MG/1
2 TABLET ORAL 2 TIMES DAILY
Qty: 180 TABLET | Refills: 3 | Status: SHIPPED | OUTPATIENT
Start: 2022-09-12

## 2022-09-12 RX ORDER — ALLOPURINOL 300 MG/1
TABLET ORAL
Qty: 90 TABLET | Refills: 3 | Status: SHIPPED | OUTPATIENT
Start: 2022-09-12

## 2022-09-15 ENCOUNTER — OFFICE VISIT (OUTPATIENT)
Dept: FAMILY MEDICINE CLINIC | Age: 47
End: 2022-09-15
Payer: COMMERCIAL

## 2022-09-15 VITALS
HEIGHT: 74 IN | BODY MASS INDEX: 37.22 KG/M2 | TEMPERATURE: 97.2 F | HEART RATE: 108 BPM | WEIGHT: 290 LBS | OXYGEN SATURATION: 97 % | SYSTOLIC BLOOD PRESSURE: 138 MMHG | DIASTOLIC BLOOD PRESSURE: 88 MMHG

## 2022-09-15 DIAGNOSIS — R00.0 TACHYCARDIA: Primary | ICD-10-CM

## 2022-09-15 DIAGNOSIS — G47.33 OSA (OBSTRUCTIVE SLEEP APNEA): ICD-10-CM

## 2022-09-15 DIAGNOSIS — I10 ESSENTIAL HYPERTENSION: ICD-10-CM

## 2022-09-15 DIAGNOSIS — R00.0 TACHYCARDIA: ICD-10-CM

## 2022-09-15 LAB
ALBUMIN SERPL-MCNC: 4.7 G/DL (ref 3.5–5.2)
ALP BLD-CCNC: 78 U/L (ref 40–129)
ALT SERPL-CCNC: 31 U/L (ref 0–40)
ANION GAP SERPL CALCULATED.3IONS-SCNC: 14 MMOL/L (ref 7–16)
AST SERPL-CCNC: 32 U/L (ref 0–39)
BASOPHILS ABSOLUTE: 0.06 E9/L (ref 0–0.2)
BASOPHILS RELATIVE PERCENT: 0.7 % (ref 0–2)
BILIRUB SERPL-MCNC: 0.4 MG/DL (ref 0–1.2)
BUN BLDV-MCNC: 13 MG/DL (ref 6–20)
CALCIUM SERPL-MCNC: 9.3 MG/DL (ref 8.6–10.2)
CHLORIDE BLD-SCNC: 108 MMOL/L (ref 98–107)
CHOLESTEROL, TOTAL: 205 MG/DL (ref 0–199)
CO2: 21 MMOL/L (ref 22–29)
CREAT SERPL-MCNC: 1.1 MG/DL (ref 0.7–1.2)
EOSINOPHILS ABSOLUTE: 0.11 E9/L (ref 0.05–0.5)
EOSINOPHILS RELATIVE PERCENT: 1.3 % (ref 0–6)
GFR AFRICAN AMERICAN: >60
GFR NON-AFRICAN AMERICAN: >60 ML/MIN/1.73
GLUCOSE BLD-MCNC: 81 MG/DL (ref 74–99)
HCT VFR BLD CALC: 44.1 % (ref 37–54)
HDLC SERPL-MCNC: 46 MG/DL
HEMOGLOBIN: 15 G/DL (ref 12.5–16.5)
IMMATURE GRANULOCYTES #: 0.03 E9/L
IMMATURE GRANULOCYTES %: 0.4 % (ref 0–5)
LDL CHOLESTEROL CALCULATED: 139 MG/DL (ref 0–99)
LYMPHOCYTES ABSOLUTE: 2.04 E9/L (ref 1.5–4)
LYMPHOCYTES RELATIVE PERCENT: 23.8 % (ref 20–42)
MCH RBC QN AUTO: 30.3 PG (ref 26–35)
MCHC RBC AUTO-ENTMCNC: 34 % (ref 32–34.5)
MCV RBC AUTO: 89.1 FL (ref 80–99.9)
MONOCYTES ABSOLUTE: 0.51 E9/L (ref 0.1–0.95)
MONOCYTES RELATIVE PERCENT: 6 % (ref 2–12)
NEUTROPHILS ABSOLUTE: 5.81 E9/L (ref 1.8–7.3)
NEUTROPHILS RELATIVE PERCENT: 67.8 % (ref 43–80)
PDW BLD-RTO: 13.2 FL (ref 11.5–15)
PLATELET # BLD: 274 E9/L (ref 130–450)
PMV BLD AUTO: 9.9 FL (ref 7–12)
POTASSIUM SERPL-SCNC: 4.2 MMOL/L (ref 3.5–5)
RBC # BLD: 4.95 E12/L (ref 3.8–5.8)
SODIUM BLD-SCNC: 143 MMOL/L (ref 132–146)
TOTAL PROTEIN: 7.8 G/DL (ref 6.4–8.3)
TRIGL SERPL-MCNC: 98 MG/DL (ref 0–149)
TSH SERPL DL<=0.05 MIU/L-ACNC: 0.77 UIU/ML (ref 0.27–4.2)
VLDLC SERPL CALC-MCNC: 20 MG/DL
WBC # BLD: 8.6 E9/L (ref 4.5–11.5)

## 2022-09-15 PROCEDURE — 99213 OFFICE O/P EST LOW 20 MIN: CPT | Performed by: FAMILY MEDICINE

## 2022-09-15 PROCEDURE — G8427 DOCREV CUR MEDS BY ELIG CLIN: HCPCS | Performed by: FAMILY MEDICINE

## 2022-09-15 PROCEDURE — 4004F PT TOBACCO SCREEN RCVD TLK: CPT | Performed by: FAMILY MEDICINE

## 2022-09-15 PROCEDURE — G8417 CALC BMI ABV UP PARAM F/U: HCPCS | Performed by: FAMILY MEDICINE

## 2022-09-15 RX ORDER — HYDRALAZINE HYDROCHLORIDE 50 MG/1
50 TABLET, FILM COATED ORAL 3 TIMES DAILY
Qty: 270 TABLET | Refills: 1 | Status: SHIPPED | OUTPATIENT
Start: 2022-09-15

## 2022-09-15 RX ORDER — HYDRALAZINE HYDROCHLORIDE 25 MG/1
25 TABLET, FILM COATED ORAL 3 TIMES DAILY
Qty: 270 TABLET | Refills: 1 | Status: SHIPPED | OUTPATIENT
Start: 2022-09-15

## 2022-09-15 NOTE — PROGRESS NOTES
[facility]  Office Progress Note - Dr. Sumanth Joe  9/15/22    CC:   Chief Complaint   Patient presents with    Hypertension        /88   Pulse (!) 108   Temp 97.2 °F (36.2 °C) (Temporal)   Ht 6' 2\" (1.88 m)   Wt 290 lb (131.5 kg)   SpO2 97%   BMI 37.23 kg/m²   Wt Readings from Last 3 Encounters:   09/15/22 290 lb (131.5 kg)   03/15/22 294 lb (133.4 kg)   09/14/21 290 lb 9.6 oz (131.8 kg)       HPI: Hypertension  Follow-up  Blood pressure is borderline controlled today. Better on recheck. Will see sometimes 120s/70s in the morning when he wakes. DUSTY remains regularly Tx. BP Readings from Last 3 Encounters:   09/15/22 138/88   03/15/22 134/84   09/14/21 134/78     Patient continues medications regularly. Compliance is good. Denies CP, sob, abd pain, headaches, vision changes, dizziness, hypotensive symptoms. No side effects from medications noted. His family had covid, he tested Neg, but his BP dropped majorly around the same time - 80/60. For a few days with like this when skipping hydralazine, then gradually came back up. Every once in a while he feels palps. Home readings 130s/80s  Feels like heavy beats, no other symptoms. Maybe once a day. Cut back on caffeine. Seems like hasnt recurred since.     _________________________________________________________    Assessment / Torres Villaseñor was seen today for hypertension. Diagnoses and all orders for this visit:    Tachycardia  -     CBC with Auto Differential; Future  -     Comprehensive Metabolic Panel; Future  -     Lipid Panel; Future  -     TSH; Future  Mild tachycardia today with occ strong heartbeat appreciated for seconds without negative symptoms. Check labs. Normal exam today. Nonsyncopal, no angina. Essential hypertension  -     hydrALAZINE (APRESOLINE) 50 MG tablet; Take 1 tablet by mouth 3 times daily  -     hydrALAZINE (APRESOLINE) 25 MG tablet;  Take 1 tablet by mouth 3 times daily  -     Lipid Panel; Future  Continues doxazosin. BP borderline but controlled and regularly controlled at home. Continues Tx sleep apnea. Work on weight loss. Feels quite well with activity and overall. DUSTY (obstructive sleep apnea)  Continues to use CPAP nightly and feels well. Sometimes difficult to sleep without it now. Will feel more tired the next day if accidentally falls asleep without it. Return in about 6 months (around 3/15/2023). or as scheduled. Patient counseled to follow up sooner or seek more acute care if symptoms worsening or not improving according to plan. Electronically signed by Valentin Shine MD on 9/15/2022    _________________________________________________________  Current Outpatient Medications on File Prior to Visit   Medication Sig Dispense Refill    allopurinol (ZYLOPRIM) 300 MG tablet TAKE ONE TABLET BY MOUTH ONCE DAILY FOR GOUT PREVENTION 90 tablet 3    doxazosin (CARDURA) 2 MG tablet Take 1 tablet by mouth 2 times daily bid 180 tablet 3    triamcinolone (KENALOG) 0.1 % cream Apply topically 1-2 times daily to rash areas. 45 g 0     No current facility-administered medications on file prior to visit.        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     _________________________________________________________  Past Medical History:   Diagnosis Date    Hyperlipidemia     Hypertension        Family History   Problem Relation Age of Onset    Heart Disease Mother     Heart Attack Mother     High Blood Pressure Mother     Heart Disease Father     Heart Attack Father     High Blood Pressure Father        Past Surgical History:   Procedure Laterality Date    CYST REMOVAL Right     Back    WISDOM TOOTH EXTRACTION         Social History     Tobacco Use    Smoking status: Never    Smokeless tobacco: Current     Types: Chew   Substance Use Topics    Alcohol use: Yes     Comment: SOCIAL    Drug use: No       Chart reviewed and updated where appropriate for PMH, Fam, and Soc Hx.  _________________________________________________________  ROS: POSITIVE: As in the HPI. Otherwise Pertinent negatives are negative.    __________________________________________________________  Physical Exam   Constitutional:    He is oriented to person, place, and time. He appears well-developed and well-nourished. Eyes:    Conjunctivae are normal.    Pupils are equal, round, and reactive to light. EOMI. Neck:    Normal range of motion. No thyromegaly or nodules noted. No bruit. No LAD. Cardiovascular:    Normal rate, regular rhythm and normal heart sounds. No murmur. No gallop and no friction rub. Pulmonary/Chest:    Effort normal and breath sounds normal.    No wheezes. No rales or rhonchi. Abdominal:    Soft. Bowel sounds are normal.    No distension. No tenderness. Musculoskeletal:    Normal range of motion. No joint swelling noted. No peripheral edema. Skin:    Skin is warm and dry. No rashes, lesions. Psychiatric:    He has a normal mood and affect. Normal groom and dress. No SI or HI.   ________________________________________________________    This note may have been created using dictation software.  Efforts were made to reduce errors, but some may persist.

## 2022-11-22 DIAGNOSIS — I10 ESSENTIAL HYPERTENSION: ICD-10-CM

## 2022-11-22 RX ORDER — HYDRALAZINE HYDROCHLORIDE 25 MG/1
25 TABLET, FILM COATED ORAL 3 TIMES DAILY
Qty: 270 TABLET | Refills: 1 | OUTPATIENT
Start: 2022-11-22

## 2022-11-22 RX ORDER — HYDRALAZINE HYDROCHLORIDE 50 MG/1
50 TABLET, FILM COATED ORAL 3 TIMES DAILY
Qty: 270 TABLET | Refills: 1 | OUTPATIENT
Start: 2022-11-22

## 2022-12-15 RX ORDER — TRIAMCINOLONE ACETONIDE 1 MG/G
CREAM TOPICAL
Qty: 45 G | Refills: 0 | Status: SHIPPED | OUTPATIENT
Start: 2022-12-15

## 2022-12-15 NOTE — TELEPHONE ENCOUNTER
Last Appointment:  9/15/2022  Future Appointments   Date Time Provider Wesly Saini   3/16/2023  2:40 PM Oumou Herndon  [address]

## 2023-03-03 ENCOUNTER — OFFICE VISIT (OUTPATIENT)
Dept: FAMILY MEDICINE CLINIC | Age: 48
End: 2023-03-03
Payer: COMMERCIAL

## 2023-03-03 VITALS
RESPIRATION RATE: 18 BRPM | WEIGHT: 303 LBS | OXYGEN SATURATION: 96 % | HEIGHT: 74 IN | HEART RATE: 96 BPM | TEMPERATURE: 98.4 F | DIASTOLIC BLOOD PRESSURE: 80 MMHG | SYSTOLIC BLOOD PRESSURE: 124 MMHG | BODY MASS INDEX: 38.89 KG/M2

## 2023-03-03 DIAGNOSIS — J01.40 ACUTE NON-RECURRENT PANSINUSITIS: Primary | ICD-10-CM

## 2023-03-03 PROCEDURE — G8427 DOCREV CUR MEDS BY ELIG CLIN: HCPCS | Performed by: PHYSICIAN ASSISTANT

## 2023-03-03 PROCEDURE — 3079F DIAST BP 80-89 MM HG: CPT | Performed by: PHYSICIAN ASSISTANT

## 2023-03-03 PROCEDURE — 99213 OFFICE O/P EST LOW 20 MIN: CPT | Performed by: PHYSICIAN ASSISTANT

## 2023-03-03 PROCEDURE — G8417 CALC BMI ABV UP PARAM F/U: HCPCS | Performed by: PHYSICIAN ASSISTANT

## 2023-03-03 PROCEDURE — G8484 FLU IMMUNIZE NO ADMIN: HCPCS | Performed by: PHYSICIAN ASSISTANT

## 2023-03-03 PROCEDURE — 4004F PT TOBACCO SCREEN RCVD TLK: CPT | Performed by: PHYSICIAN ASSISTANT

## 2023-03-03 PROCEDURE — 3074F SYST BP LT 130 MM HG: CPT | Performed by: PHYSICIAN ASSISTANT

## 2023-03-03 RX ORDER — AMOXICILLIN AND CLAVULANATE POTASSIUM 875; 125 MG/1; MG/1
1 TABLET, FILM COATED ORAL 2 TIMES DAILY
Qty: 20 TABLET | Refills: 0 | Status: SHIPPED | OUTPATIENT
Start: 2023-03-03 | End: 2023-03-13

## 2023-03-03 NOTE — PROGRESS NOTES
Chief Complaint       Sinusitis (X 4 days), Facial Pain, and Drainage      History of Present Illness   Source of history provided by:  patient. Isela Delgado is a 41 y.o. old male presenting to the walk in clinic for evaluation of sinus pressure, nasal congestion, discolored nasal drainage, mild nonproductive cough and sore throat x 4 days. Has been taking Mucinex DM OTC without relief. Denies any fever, chills, wheezing, CP, SOB, or GI symptoms. Denies any hx of asthma, COPD, or tobacco use. Denies any contact with any individuals with known COVID-19 infection or under investigation for COVID-19 infection. ROS    Unless otherwise stated in this report or unable to obtain because of the patient's clinical or mental status as evidenced by the medical record, this patients's positive and negative responses for Review of Systems, constitutional, psych, eyes, ENT, cardiovascular, respiratory, gastrointestinal, neurological, genitourinary, musculoskeletal, integument systems and systems related to the presenting problem are either stated in the preceding or were not pertinent or were negative for the symptoms and/or complaints related to the medical problem. Past Medical History:  has a past medical history of Hyperlipidemia and Hypertension. Past Surgical History:  has a past surgical history that includes [place] tooth extraction and cyst removal (Right). Social History:  reports that he has never smoked. His smokeless tobacco use includes chew. He reports current alcohol use. He reports that he does not use drugs. Family History: family history includes Heart Attack in his father and mother; Heart Disease in his father and mother; High Blood Pressure in his father and mother. Allergies: Patient has no known allergies.     Physical Exam         VS:  /80   Pulse 96   Temp 98.4 °F (36.9 °C)   Resp 18   Ht 6' 2\" (1.88 m)   Wt (!) 303 lb (137.4 kg)   SpO2 96%   BMI 38.90 kg/m²    Oxygen Saturation Interpretation: Normal.    Constitutional:  Alert, development consistent with age. Head: Mild TTP over the bilateral frontal, maxillary, and ethmoid sinuses. Ears:  External Ears: Bilateral pinna normal. TMs dull without erythema or perforation bilaterally. Canals normal bilaterally without swelling or exudate  Nose: Mild congestion of the nasal mucosa. There is mild injection to middle turbinates bilaterally. Throat: Mild posterior pharyngeal erythema with mild post nasal drip present. No exudate or tonsillar hypertrophy noted. Neck:  Supple. There is no anterior cervical adenopathy. Lungs: CTAB without wheezes, rales, or rhonchi  Heart:  Regular rate and rhythm, normal heart sounds, without pathological murmurs, ectopy, gallops, or rubs. Skin:  Normal turgor. Warm, dry, without visible rash. Neurological:  Alert and oriented. Motor functions intact. Responds to verbal commands. Lab / Imaging Results   (All laboratory and radiology results have been personally reviewed by myself)  Labs:  No results found for this visit on 03/03/23. Imaging: All Radiology results interpreted by Radiologist unless otherwise noted. Assessment / Plan     Impression(s):  Sebastian was seen today for sinusitis, facial pain and drainage. Diagnoses and all orders for this visit:    Acute non-recurrent pansinusitis  -     amoxicillin-clavulanate (AUGMENTIN) 875-125 MG per tablet; Take 1 tablet by mouth 2 times daily for 10 days    Disposition:  Disposition: Discharge to home. Script written for Augmentin, side effects discussed. Increase fluids and rest. Symptomatic relief discussed. F/u PCP in 5-7 days if symptoms persist. ED sooner if symptoms worsen or change. Red flag symptoms discussed. Pt is in agreement with this care plan. All questions answered. Neo Forman PA-C    **This report was transcribed using voice recognition software.  Every effort was made to ensure accuracy; however, inadvertent computerized transcription errors may be present.

## 2023-03-20 RX ORDER — TRIAMCINOLONE ACETONIDE 1 MG/G
CREAM TOPICAL
Qty: 45 G | Refills: 0 | Status: SHIPPED | OUTPATIENT
Start: 2023-03-20

## 2023-03-20 NOTE — TELEPHONE ENCOUNTER
Last Appointment:  9/15/2022  Future Appointments   Date Time Provider Wesly Saini   4/14/2023  9:40 AM Rich Kim  [address]

## 2023-10-20 ENCOUNTER — OFFICE VISIT (OUTPATIENT)
Dept: FAMILY MEDICINE CLINIC | Age: 48
End: 2023-10-20
Payer: COMMERCIAL

## 2023-10-20 VITALS
OXYGEN SATURATION: 96 % | DIASTOLIC BLOOD PRESSURE: 80 MMHG | SYSTOLIC BLOOD PRESSURE: 140 MMHG | RESPIRATION RATE: 16 BRPM | HEART RATE: 86 BPM | BODY MASS INDEX: 39.03 KG/M2 | WEIGHT: 304 LBS | TEMPERATURE: 98.1 F

## 2023-10-20 DIAGNOSIS — L85.0 ACQUIRED ICHTHYOSIS: ICD-10-CM

## 2023-10-20 DIAGNOSIS — I10 ESSENTIAL HYPERTENSION: Primary | ICD-10-CM

## 2023-10-20 DIAGNOSIS — Z12.11 COLON CANCER SCREENING: ICD-10-CM

## 2023-10-20 PROCEDURE — G8417 CALC BMI ABV UP PARAM F/U: HCPCS | Performed by: FAMILY MEDICINE

## 2023-10-20 PROCEDURE — 99214 OFFICE O/P EST MOD 30 MIN: CPT | Performed by: FAMILY MEDICINE

## 2023-10-20 PROCEDURE — 3079F DIAST BP 80-89 MM HG: CPT | Performed by: FAMILY MEDICINE

## 2023-10-20 PROCEDURE — 3077F SYST BP >= 140 MM HG: CPT | Performed by: FAMILY MEDICINE

## 2023-10-20 PROCEDURE — G8427 DOCREV CUR MEDS BY ELIG CLIN: HCPCS | Performed by: FAMILY MEDICINE

## 2023-10-20 PROCEDURE — G8484 FLU IMMUNIZE NO ADMIN: HCPCS | Performed by: FAMILY MEDICINE

## 2023-10-20 PROCEDURE — 4004F PT TOBACCO SCREEN RCVD TLK: CPT | Performed by: FAMILY MEDICINE

## 2023-10-20 RX ORDER — CLOBETASOL PROPIONATE 0.5 MG/G
OINTMENT TOPICAL
Qty: 30 G | Refills: 0 | Status: SHIPPED | OUTPATIENT
Start: 2023-10-20

## 2023-11-03 DIAGNOSIS — I10 ESSENTIAL HYPERTENSION: ICD-10-CM

## 2023-11-03 DIAGNOSIS — M1A.0790 IDIOPATHIC CHRONIC GOUT OF FOOT WITHOUT TOPHUS, UNSPECIFIED LATERALITY: ICD-10-CM

## 2023-11-03 RX ORDER — DOXAZOSIN 2 MG/1
2 TABLET ORAL 2 TIMES DAILY
Qty: 180 TABLET | Refills: 3 | Status: SHIPPED | OUTPATIENT
Start: 2023-11-03

## 2023-11-03 RX ORDER — ALLOPURINOL 300 MG/1
TABLET ORAL
Qty: 90 TABLET | Refills: 3 | Status: SHIPPED | OUTPATIENT
Start: 2023-11-03

## 2023-11-03 NOTE — TELEPHONE ENCOUNTER
Last Appointment:  10/20/2023  Future Appointments   Date Time Provider [address]   4/22/2024  8:20 AM Fabiola Bautista  [facility] [employer]

## 2023-12-04 ENCOUNTER — OFFICE VISIT (OUTPATIENT)
Dept: FAMILY MEDICINE CLINIC | Age: 48
End: 2023-12-04
Payer: COMMERCIAL

## 2023-12-04 VITALS
TEMPERATURE: 98.5 F | HEIGHT: 74 IN | OXYGEN SATURATION: 97 % | WEIGHT: 310 LBS | SYSTOLIC BLOOD PRESSURE: 132 MMHG | RESPIRATION RATE: 18 BRPM | BODY MASS INDEX: 39.78 KG/M2 | DIASTOLIC BLOOD PRESSURE: 86 MMHG | HEART RATE: 89 BPM

## 2023-12-04 DIAGNOSIS — B96.89 ACUTE BACTERIAL SINUSITIS: Primary | ICD-10-CM

## 2023-12-04 DIAGNOSIS — J01.90 ACUTE BACTERIAL SINUSITIS: Primary | ICD-10-CM

## 2023-12-04 PROCEDURE — G8417 CALC BMI ABV UP PARAM F/U: HCPCS | Performed by: STUDENT IN AN ORGANIZED HEALTH CARE EDUCATION/TRAINING PROGRAM

## 2023-12-04 PROCEDURE — 4004F PT TOBACCO SCREEN RCVD TLK: CPT | Performed by: STUDENT IN AN ORGANIZED HEALTH CARE EDUCATION/TRAINING PROGRAM

## 2023-12-04 PROCEDURE — G8427 DOCREV CUR MEDS BY ELIG CLIN: HCPCS | Performed by: STUDENT IN AN ORGANIZED HEALTH CARE EDUCATION/TRAINING PROGRAM

## 2023-12-04 PROCEDURE — 3075F SYST BP GE 130 - 139MM HG: CPT | Performed by: STUDENT IN AN ORGANIZED HEALTH CARE EDUCATION/TRAINING PROGRAM

## 2023-12-04 PROCEDURE — G8484 FLU IMMUNIZE NO ADMIN: HCPCS | Performed by: STUDENT IN AN ORGANIZED HEALTH CARE EDUCATION/TRAINING PROGRAM

## 2023-12-04 PROCEDURE — 99213 OFFICE O/P EST LOW 20 MIN: CPT | Performed by: STUDENT IN AN ORGANIZED HEALTH CARE EDUCATION/TRAINING PROGRAM

## 2023-12-04 PROCEDURE — 3079F DIAST BP 80-89 MM HG: CPT | Performed by: STUDENT IN AN ORGANIZED HEALTH CARE EDUCATION/TRAINING PROGRAM

## 2023-12-04 RX ORDER — AMOXICILLIN 500 MG/1
500 CAPSULE ORAL 2 TIMES DAILY
Qty: 20 CAPSULE | Refills: 0 | Status: SHIPPED | OUTPATIENT
Start: 2023-12-04 | End: 2023-12-14

## 2023-12-04 RX ORDER — IPRATROPIUM BROMIDE 21 UG/1
2 SPRAY, METERED NASAL EVERY 12 HOURS
Qty: 30 ML | Refills: 0 | Status: SHIPPED | OUTPATIENT
Start: 2023-12-04

## 2023-12-04 ASSESSMENT — ENCOUNTER SYMPTOMS
SHORTNESS OF BREATH: 0
SINUS PAIN: 1
RHINORRHEA: 1
SINUS PRESSURE: 1
ABDOMINAL PAIN: 0
NAUSEA: 0
VOMITING: 0
COUGH: 0

## 2023-12-04 NOTE — PROGRESS NOTES
Sebastian Kincaid (:  1975) is a 57 y.o. male,Established patient, here for evaluation of the following chief complaint(s):  Congestion (Onset x 4 days ) and Generalized Body Aches (States that he is having some body aches)         ASSESSMENT/PLAN:  1. Acute bacterial sinusitis  -     ipratropium (ATROVENT) 0.03 % nasal spray; 2 sprays by Each Nostril route in the morning and 2 sprays in the evening., Disp-30 mL, R-0Normal  -     amoxicillin (AMOXIL) 500 MG capsule; Take 1 capsule by mouth 2 times daily for 10 days, Disp-20 capsule, R-0Normal  Discussed return and ER precautions. Patient and or parent verbalized understanding      No follow-ups on file. Subjective   SUBJECTIVE/OBJECTIVE:  Congestion, sore throat, drainage  Going on about 5 days  Taking OTC cold medicine for high BP  No cough  No SOB or fever  Mild ear pain on the L        Review of Systems   Constitutional:  Negative for chills and fever. HENT:  Positive for congestion, rhinorrhea, sinus pressure and sinus pain. Respiratory:  Negative for cough and shortness of breath. Cardiovascular:  Negative for chest pain and leg swelling. Gastrointestinal:  Negative for abdominal pain, nausea and vomiting. Genitourinary:  Negative for dysuria and hematuria. Musculoskeletal:  Negative for arthralgias and myalgias. Skin:  Negative for rash and wound. Neurological:  Negative for dizziness and light-headedness. Objective   Physical Exam  Vitals reviewed. Constitutional:       General: He is not in acute distress. HENT:      Head: Normocephalic and atraumatic. Right Ear: Tympanic membrane normal.      Ears:      Comments: Erythematous L TM     Nose: Congestion present. Right Sinus: Maxillary sinus tenderness present. No frontal sinus tenderness. Left Sinus: Maxillary sinus tenderness present. No frontal sinus tenderness. Mouth/Throat:      Pharynx: Posterior oropharyngeal erythema present.    Eyes:

## 2024-01-22 RX ORDER — TRIAMCINOLONE ACETONIDE 1 MG/G
CREAM TOPICAL
Qty: 45 G | Refills: 0 | Status: SHIPPED | OUTPATIENT
Start: 2024-01-22

## 2024-01-22 NOTE — TELEPHONE ENCOUNTER
Last Appointment:  10/20/2023  Future Appointments   Date Time Provider Department Center   4/22/2024  8:20 AM [facility]

## 2024-04-22 ENCOUNTER — OFFICE VISIT (OUTPATIENT)
Dept: FAMILY MEDICINE CLINIC | Age: 49
End: 2024-04-22
Payer: COMMERCIAL

## 2024-04-22 VITALS
OXYGEN SATURATION: 96 % | DIASTOLIC BLOOD PRESSURE: 90 MMHG | BODY MASS INDEX: 38.89 KG/M2 | TEMPERATURE: 97.7 F | HEART RATE: 103 BPM | HEIGHT: 74 IN | SYSTOLIC BLOOD PRESSURE: 150 MMHG | WEIGHT: 303 LBS

## 2024-04-22 DIAGNOSIS — I10 ESSENTIAL HYPERTENSION: ICD-10-CM

## 2024-04-22 DIAGNOSIS — R79.89 ELEVATED LFTS: Primary | ICD-10-CM

## 2024-04-22 PROCEDURE — 3080F DIAST BP >= 90 MM HG: CPT | Performed by: FAMILY MEDICINE

## 2024-04-22 PROCEDURE — 99213 OFFICE O/P EST LOW 20 MIN: CPT | Performed by: FAMILY MEDICINE

## 2024-04-22 PROCEDURE — 3077F SYST BP >= 140 MM HG: CPT | Performed by: FAMILY MEDICINE

## 2024-04-22 RX ORDER — HYDRALAZINE HYDROCHLORIDE 25 MG/1
25 TABLET, FILM COATED ORAL 3 TIMES DAILY
Qty: 270 TABLET | Refills: 3 | Status: SHIPPED | OUTPATIENT
Start: 2024-04-22

## 2024-04-22 RX ORDER — HYDRALAZINE HYDROCHLORIDE 50 MG/1
50 TABLET, FILM COATED ORAL 3 TIMES DAILY
Qty: 270 TABLET | Refills: 3 | Status: SHIPPED | OUTPATIENT
Start: 2024-04-22

## 2024-04-22 ASSESSMENT — PATIENT HEALTH QUESTIONNAIRE - PHQ9
2. FEELING DOWN, DEPRESSED OR HOPELESS: NOT AT ALL
SUM OF ALL RESPONSES TO PHQ QUESTIONS 1-9: 0
SUM OF ALL RESPONSES TO PHQ QUESTIONS 1-9: 0
1. LITTLE INTEREST OR PLEASURE IN DOING THINGS: NOT AT ALL
SUM OF ALL RESPONSES TO PHQ9 QUESTIONS 1 & 2: 0
SUM OF ALL RESPONSES TO PHQ QUESTIONS 1-9: 0
SUM OF ALL RESPONSES TO PHQ QUESTIONS 1-9: 0

## 2024-04-22 NOTE — PROGRESS NOTES
[facility]  Office Progress Note - Dr. Bautista  4/22/24    CC:   Chief Complaint   Patient presents with    Hypertension        BP (!) 150/90   Pulse (!) 103   Temp 97.7 °F (36.5 °C) (Temporal)   Ht 1.88 m (6' 2\")   Wt (!) 137.4 kg (303 lb)   SpO2 96%   BMI 38.90 kg/m²   Wt Readings from Last 3 Encounters:   04/22/24 (!) 137.4 kg (303 lb)   12/04/23 (!) 140.6 kg (310 lb)   10/20/23 (!) 137.9 kg (304 lb)       HPI: Hypertension  Follow-up  Blood pressure is not controlled today. Took bp meds about 3 hours ago.   Using CPAP regularly all night at least 4 hours every night. Benefiting.   BP Readings from Last 3 Encounters:   04/22/24 (!) 150/90   12/04/23 132/86   10/20/23 (!) 140/80     Patient continues medications regularly. Compliance is good.  Denies CP, sob, abd pain, headaches, vision changes, dizziness, hypotensive symptoms.   No side effects from medications noted.     _________________________________________________________    Assessment / Plan  Sebastian was seen today for hypertension.    Diagnoses and all orders for this visit:    Elevated LFTs    Essential hypertension  -     hydrALAZINE (APRESOLINE) 25 MG tablet; Take 1 tablet by mouth 3 times daily  -     hydrALAZINE (APRESOLINE) 50 MG tablet; Take 1 tablet by mouth 3 times daily  Not controlled today.  Unsure reason. Good adherence to meds and cpap.   Weight steady but high.   Reiewed weight loss, cardio exercise, low salt, good meds, good cpap.   CHERYL workup in past and nephro consult did not yield a secondary cause.   Check pressures over next 2-3 weeks at home and send them in.   Adjust meds if needed.     On LFT review \"elevation\" really depends on lab reference range. Has been stable over time and not worsening. We'll keep an eye on it. Weight loss may help.       Return in about 6 months (around 10/22/2024). or as scheduled.   Patient counseled to follow up sooner or seek more acute care if symptoms worsening or not

## 2024-10-21 ENCOUNTER — OFFICE VISIT (OUTPATIENT)
Dept: FAMILY MEDICINE CLINIC | Age: 49
End: 2024-10-21
Payer: COMMERCIAL

## 2024-10-21 VITALS
OXYGEN SATURATION: 98 % | BODY MASS INDEX: 38.37 KG/M2 | WEIGHT: 299 LBS | HEIGHT: 74 IN | SYSTOLIC BLOOD PRESSURE: 144 MMHG | HEART RATE: 95 BPM | TEMPERATURE: 97.8 F | DIASTOLIC BLOOD PRESSURE: 70 MMHG

## 2024-10-21 DIAGNOSIS — I10 ESSENTIAL HYPERTENSION: Primary | ICD-10-CM

## 2024-10-21 DIAGNOSIS — Z12.11 COLON CANCER SCREENING: ICD-10-CM

## 2024-10-21 DIAGNOSIS — I10 ESSENTIAL HYPERTENSION: ICD-10-CM

## 2024-10-21 LAB
ALBUMIN: 4.5 G/DL (ref 3.5–5.2)
ALP BLD-CCNC: 67 U/L (ref 40–129)
ALT SERPL-CCNC: 34 U/L (ref 0–40)
ANION GAP SERPL CALCULATED.3IONS-SCNC: 11 MMOL/L (ref 7–16)
AST SERPL-CCNC: 31 U/L (ref 0–39)
BASOPHILS ABSOLUTE: 0.03 K/UL (ref 0–0.2)
BASOPHILS RELATIVE PERCENT: 0 % (ref 0–2)
BILIRUB SERPL-MCNC: 0.7 MG/DL (ref 0–1.2)
BUN BLDV-MCNC: 13 MG/DL (ref 6–20)
CALCIUM SERPL-MCNC: 9.7 MG/DL (ref 8.6–10.2)
CHLORIDE BLD-SCNC: 103 MMOL/L (ref 98–107)
CHOLESTEROL, TOTAL: 212 MG/DL
CO2: 26 MMOL/L (ref 22–29)
CREAT SERPL-MCNC: 1.2 MG/DL (ref 0.7–1.2)
CREATININE URINE: 170 MG/DL (ref 40–278)
EOSINOPHILS ABSOLUTE: 0.09 K/UL (ref 0.05–0.5)
EOSINOPHILS RELATIVE PERCENT: 1 % (ref 0–6)
GFR, ESTIMATED: 73 ML/MIN/1.73M2
GLUCOSE BLD-MCNC: 124 MG/DL (ref 74–99)
HCT VFR BLD CALC: 49 % (ref 37–54)
HDLC SERPL-MCNC: 50 MG/DL
HEMOGLOBIN: 15.5 G/DL (ref 12.5–16.5)
IMMATURE GRANULOCYTES %: 0 % (ref 0–5)
IMMATURE GRANULOCYTES ABSOLUTE: 0.03 K/UL (ref 0–0.58)
LDL CHOLESTEROL: 142 MG/DL
LYMPHOCYTES ABSOLUTE: 1.58 K/UL (ref 1.5–4)
LYMPHOCYTES RELATIVE PERCENT: 22 % (ref 20–42)
MCH RBC QN AUTO: 28.6 PG (ref 26–35)
MCHC RBC AUTO-ENTMCNC: 31.6 G/DL (ref 32–34.5)
MCV RBC AUTO: 90.4 FL (ref 80–99.9)
MICROALBUMIN/CREAT 24H UR: <12 MG/L (ref 0–19)
MICROALBUMIN/CREAT UR-RTO: NORMAL MCG/MG CREAT (ref 0–30)
MONOCYTES ABSOLUTE: 0.31 K/UL (ref 0.1–0.95)
MONOCYTES RELATIVE PERCENT: 4 % (ref 2–12)
NEUTROPHILS ABSOLUTE: 5.25 K/UL (ref 1.8–7.3)
NEUTROPHILS RELATIVE PERCENT: 72 % (ref 43–80)
PDW BLD-RTO: 13.4 % (ref 11.5–15)
PLATELET # BLD: 262 K/UL (ref 130–450)
PMV BLD AUTO: 9.9 FL (ref 7–12)
POTASSIUM SERPL-SCNC: 4.6 MMOL/L (ref 3.5–5)
RBC # BLD: 5.42 M/UL (ref 3.8–5.8)
SODIUM BLD-SCNC: 140 MMOL/L (ref 132–146)
TOTAL PROTEIN: 7.3 G/DL (ref 6.4–8.3)
TRIGL SERPL-MCNC: 98 MG/DL
VLDLC SERPL CALC-MCNC: 20 MG/DL
WBC # BLD: 7.3 K/UL (ref 4.5–11.5)

## 2024-10-21 PROCEDURE — 99213 OFFICE O/P EST LOW 20 MIN: CPT | Performed by: FAMILY MEDICINE

## 2024-10-21 PROCEDURE — 3077F SYST BP >= 140 MM HG: CPT | Performed by: FAMILY MEDICINE

## 2024-10-21 PROCEDURE — 3078F DIAST BP <80 MM HG: CPT | Performed by: FAMILY MEDICINE

## 2024-10-21 RX ORDER — DOXAZOSIN 2 MG/1
2 TABLET ORAL 2 TIMES DAILY
Qty: 180 TABLET | Refills: 3 | Status: SHIPPED | OUTPATIENT
Start: 2024-10-21

## 2024-10-21 RX ORDER — ALLOPURINOL 300 MG/1
TABLET ORAL
Qty: 90 TABLET | Refills: 3 | Status: SHIPPED | OUTPATIENT
Start: 2024-10-21

## 2024-10-21 NOTE — PROGRESS NOTES
[facility]  Office Progress Note - Dr. Bautista  10/21/24    CC:   Chief Complaint   Patient presents with    Hypertension        BP (!) 144/70 (Site: Right Upper Arm)   Pulse 95   Temp 97.8 °F (36.6 °C) (Temporal)   Ht 1.88 m (6' 2\")   Wt 135.6 kg (299 lb)   SpO2 98%   BMI 38.39 kg/m²   Wt Readings from Last 3 Encounters:   10/21/24 135.6 kg (299 lb)   04/22/24 (!) 137.4 kg (303 lb)   12/04/23 (!) 140.6 kg (310 lb)       HPI: Hypertension  Follow-up  Blood pressure is borderline controlled today.  Improved significantly since rooming.  Took morning BP meds about  minutes ago.   No recent BP monitoring at home.   BP Readings from Last 3 Encounters:   10/21/24 (!) 144/70   04/22/24 (!) 150/90   12/04/23 132/86   + headache last weekend, pressure behind eyes.  Otherwise nothing.     Patient continues medications regularly. Compliance is good.  Denies CP, sob, abd pain, vision changes, dizziness, hypotensive symptoms.   No side effects from medications noted.     Feels well overall.       Needs refills.   _________________________________________________________    Assessment / Plan  Sebastian was seen today for hypertension.    Diagnoses and all orders for this visit:    Essential hypertension, not at goal but improved on recheck  Start monitoring BP at home again 4-5 readings per week at least, different times of day.   Continue same BP meds for now.   Tx DUSTY nightly, regularly.  -   refill  doxazosin (CARDURA) 2 MG tablet; Take 1 tablet by mouth 2 times daily bid  -     CBC with Auto Differential; Future  -     Comprehensive Metabolic Panel; Future  -     Lipid Panel; Future  -     Microalbumin, Ur; Future    Colon cancer screening  -     External Referral To Gastroenterology    Other orders  -     allopurinol (ZYLOPRIM) 300 MG tablet; TAKE ONE TABLET BY MOUTH ONCE DAILY FOR GOUT PREVENTION      Return in about 6 months (around 4/21/2025). or as scheduled.   Patient counseled to

## 2024-10-24 ENCOUNTER — OFFICE VISIT (OUTPATIENT)
Dept: FAMILY MEDICINE CLINIC | Age: 49
End: 2024-10-24
Payer: COMMERCIAL

## 2024-10-24 VITALS
HEART RATE: 102 BPM | BODY MASS INDEX: 37.99 KG/M2 | WEIGHT: 296 LBS | DIASTOLIC BLOOD PRESSURE: 82 MMHG | TEMPERATURE: 97.6 F | OXYGEN SATURATION: 97 % | SYSTOLIC BLOOD PRESSURE: 146 MMHG | HEIGHT: 74 IN

## 2024-10-24 DIAGNOSIS — J40 SINOBRONCHITIS: Primary | ICD-10-CM

## 2024-10-24 DIAGNOSIS — J32.9 SINOBRONCHITIS: Primary | ICD-10-CM

## 2024-10-24 DIAGNOSIS — R51.9 SINUS HEADACHE: ICD-10-CM

## 2024-10-24 PROCEDURE — 99213 OFFICE O/P EST LOW 20 MIN: CPT | Performed by: EMERGENCY MEDICINE

## 2024-10-24 PROCEDURE — 3079F DIAST BP 80-89 MM HG: CPT | Performed by: EMERGENCY MEDICINE

## 2024-10-24 PROCEDURE — 3077F SYST BP >= 140 MM HG: CPT | Performed by: EMERGENCY MEDICINE

## 2024-10-24 RX ORDER — GUAIFENESIN 600 MG/1
600 TABLET, EXTENDED RELEASE ORAL 2 TIMES DAILY
Qty: 30 TABLET | Refills: 0 | Status: SHIPPED | OUTPATIENT
Start: 2024-10-24 | End: 2024-11-08

## 2024-10-24 RX ORDER — BENZONATATE 200 MG/1
200 CAPSULE ORAL 3 TIMES DAILY PRN
Qty: 30 CAPSULE | Refills: 0 | Status: SHIPPED | OUTPATIENT
Start: 2024-10-24

## 2024-10-24 ASSESSMENT — ENCOUNTER SYMPTOMS
BACK PAIN: 0
VOMITING: 0
EYE REDNESS: 0
EYE DISCHARGE: 0
DIARRHEA: 0
EYE PAIN: 0
SORE THROAT: 0
WHEEZING: 0
SHORTNESS OF BREATH: 0
COUGH: 1
NAUSEA: 0
SINUS PRESSURE: 1
ABDOMINAL PAIN: 0

## 2024-10-24 NOTE — PROGRESS NOTES
Therapy Recommended - Drug indicated but not ordered  Tenderness: There is no abdominal tenderness. There is no guarding or rebound.   Musculoskeletal:      Cervical back: Normal range of motion and neck supple.   Skin:     General: Skin is warm and dry.   Neurological:      Mental Status: He is alert and oriented to person, place, and time.      Cranial Nerves: No cranial nerve deficit.      Coordination: Coordination normal.         Test Results Section   (All laboratory and radiology results have been personally reviewed by myself)  Labs:  No results found for this visit on 10/24/24.     Imaging:  All Radiology results interpreted by Radiologist unless otherwise noted.  No results found.      Assessment / Plan   Impression(s):  Sebastian was seen today for congestion.    Diagnoses and all orders for this visit:    Sinobronchitis  -     amoxicillin-clavulanate (AUGMENTIN) 875-125 MG per tablet; Take 1 tablet by mouth 2 times daily for 10 days  -     guaiFENesin (MUCINEX) 600 MG extended release tablet; Take 1 tablet by mouth 2 times daily for 15 days  -     benzonatate (TESSALON) 200 MG capsule; Take 1 capsule by mouth 3 times daily as needed for Cough    Sinus headache         Discussed symptomatic treatments with the patient today.   Return if symptoms worsen or fail to improve.   Red flag symptoms were also discussed with the patient today. If symptoms worsen the patient is to go directly to the emergency department for reevaluation and treatment. Pt verbalizes understanding and is in agreement with plan of care. All questions answered.      New Medications     New Prescriptions    AMOXICILLIN-CLAVULANATE (AUGMENTIN) 875-125 MG PER TABLET    Take 1 tablet by mouth 2 times daily for 10 days    BENZONATATE (TESSALON) 200 MG CAPSULE    Take 1 capsule by mouth 3 times daily as needed for Cough    GUAIFENESIN (MUCINEX) 600 MG EXTENDED RELEASE TABLET    Take 1 tablet by mouth 2 times daily for 15 days       Electronically signed by Richi Pastrana DO   DD: 10/24/24

## 2024-12-03 ENCOUNTER — PATIENT MESSAGE (OUTPATIENT)
Dept: FAMILY MEDICINE CLINIC | Age: 49
End: 2024-12-03

## 2024-12-04 RX ORDER — HYDROCHLOROTHIAZIDE 25 MG/1
25 TABLET ORAL EVERY MORNING
Qty: 30 TABLET | Refills: 2 | Status: SHIPPED | OUTPATIENT
Start: 2024-12-04

## 2025-01-21 ENCOUNTER — TRANSCRIBE ORDERS (OUTPATIENT)
Dept: ADMINISTRATIVE | Age: 50
End: 2025-01-21

## 2025-01-21 DIAGNOSIS — R89.7 ABNORMAL HISTOLOGICAL FINDINGS IN SPECIMENS FROM OTHER ORGANS, SYSTEMS AND TISSUES: ICD-10-CM

## 2025-01-21 DIAGNOSIS — Z12.11 SPECIAL SCREENING FOR MALIGNANT NEOPLASM OF COLON: Primary | ICD-10-CM

## 2025-02-06 LAB
ALBUMIN SERPL-MCNC: 4.7 G/DL (ref 3.5–5.2)
ALP SERPL-CCNC: 70 U/L (ref 40–129)
ALT SERPL-CCNC: 27 U/L (ref 0–40)
ANION GAP SERPL CALCULATED.3IONS-SCNC: 10 MMOL/L (ref 7–16)
AST SERPL-CCNC: 22 U/L (ref 0–39)
BILIRUB SERPL-MCNC: 0.5 MG/DL (ref 0–1.2)
BUN SERPL-MCNC: 12 MG/DL (ref 6–20)
CALCIUM SERPL-MCNC: 9 MG/DL (ref 8.6–10.2)
CHLORIDE SERPL-SCNC: 106 MMOL/L (ref 98–107)
CK SERPL-CCNC: 204 U/L (ref 20–200)
CO2 SERPL-SCNC: 23 MMOL/L (ref 22–29)
CREAT SERPL-MCNC: 1.1 MG/DL (ref 0.7–1.2)
GFR, ESTIMATED: 81 ML/MIN/1.73M2
GLUCOSE SERPL-MCNC: 103 MG/DL (ref 74–99)
POTASSIUM SERPL-SCNC: 4 MMOL/L (ref 3.5–5)
PROT SERPL-MCNC: 7.2 G/DL (ref 6.4–8.3)
SODIUM SERPL-SCNC: 139 MMOL/L (ref 132–146)

## 2025-02-07 ENCOUNTER — HOSPITAL ENCOUNTER (OUTPATIENT)
Dept: CT IMAGING | Age: 50
Discharge: HOME | End: 2025-02-09
Attending: INTERNAL MEDICINE
Payer: COMMERCIAL

## 2025-02-07 DIAGNOSIS — R89.7 ABNORMAL HISTOLOGICAL FINDINGS IN SPECIMENS FROM OTHER ORGANS, SYSTEMS AND TISSUES: ICD-10-CM

## 2025-02-07 DIAGNOSIS — Z12.11 SPECIAL SCREENING FOR MALIGNANT NEOPLASM OF COLON: ICD-10-CM

## 2025-02-07 PROCEDURE — 6360000004 HC RX CONTRAST MEDICATION: Performed by: RADIOLOGY

## 2025-02-07 PROCEDURE — 2500000003 HC RX 250 WO HCPCS: Performed by: RADIOLOGY

## 2025-02-07 PROCEDURE — 74178 CT ABD&PLV WO CNTR FLWD CNTR: CPT

## 2025-02-07 RX ORDER — SODIUM CHLORIDE 0.9 % (FLUSH) 0.9 %
10 SYRINGE (ML) INJECTION PRN
Status: DISCONTINUED | OUTPATIENT
Start: 2025-02-07 | End: 2025-02-10 | Stop reason: HOSPADM

## 2025-02-07 RX ORDER — IOPAMIDOL 755 MG/ML
75 INJECTION, SOLUTION INTRAVASCULAR
Status: COMPLETED | OUTPATIENT
Start: 2025-02-07 | End: 2025-02-07

## 2025-02-07 RX ORDER — IOPAMIDOL 755 MG/ML
18 INJECTION, SOLUTION INTRAVASCULAR
Status: COMPLETED | OUTPATIENT
Start: 2025-02-07 | End: 2025-02-07

## 2025-02-07 RX ADMIN — SODIUM CHLORIDE, PRESERVATIVE FREE 10 ML: 5 INJECTION INTRAVENOUS at 15:31

## 2025-02-07 RX ADMIN — IOPAMIDOL 18 ML: 755 INJECTION, SOLUTION INTRAVENOUS at 15:31

## 2025-02-07 RX ADMIN — IOPAMIDOL 75 ML: 755 INJECTION, SOLUTION INTRAVENOUS at 15:31

## 2025-04-09 NOTE — TELEPHONE ENCOUNTER
Name of Medication(s) Requested:  Requested Prescriptions     Pending Prescriptions Disp Refills    hydroCHLOROthiazide (HYDRODIURIL) 25 MG tablet [Pharmacy Med Name: HYDROCHLOROTHIAZIDE 25 MG TAB] 90 tablet 1     Sig: TAKE ONE TABLET BY MOUTH EVERY MORNING       Medication is on current medication list Yes    Dosage and directions were verified? Yes    Quantity verified: 90 day supply     Pharmacy Verified?  Yes    Last Appointment:  10/21/2024    Future appts:  Future Appointments   Date Time Provider Department Center   4/21/2025  8:20 AM Richi Bautista MD [facility]        (If no appt send self scheduling link. .REFILLAPPT)  Scheduling request sent?     [] Yes  [x] No    Does patient need updated?  [] Yes  [x] No

## 2025-04-10 RX ORDER — HYDROCHLOROTHIAZIDE 25 MG/1
25 TABLET ORAL EVERY MORNING
Qty: 90 TABLET | Refills: 1 | Status: SHIPPED | OUTPATIENT
Start: 2025-04-10

## 2025-04-21 ENCOUNTER — OFFICE VISIT (OUTPATIENT)
Dept: FAMILY MEDICINE CLINIC | Age: 50
End: 2025-04-21
Payer: COMMERCIAL

## 2025-04-21 VITALS
DIASTOLIC BLOOD PRESSURE: 84 MMHG | BODY MASS INDEX: 36.83 KG/M2 | SYSTOLIC BLOOD PRESSURE: 136 MMHG | WEIGHT: 287 LBS | HEIGHT: 74 IN | HEART RATE: 76 BPM | OXYGEN SATURATION: 98 % | TEMPERATURE: 97.7 F

## 2025-04-21 DIAGNOSIS — I10 ESSENTIAL HYPERTENSION: ICD-10-CM

## 2025-04-21 PROCEDURE — 3075F SYST BP GE 130 - 139MM HG: CPT | Performed by: FAMILY MEDICINE

## 2025-04-21 PROCEDURE — 99213 OFFICE O/P EST LOW 20 MIN: CPT | Performed by: FAMILY MEDICINE

## 2025-04-21 PROCEDURE — 3079F DIAST BP 80-89 MM HG: CPT | Performed by: FAMILY MEDICINE

## 2025-04-21 RX ORDER — HYDRALAZINE HYDROCHLORIDE 100 MG/1
100 TABLET, FILM COATED ORAL 3 TIMES DAILY
Qty: 270 TABLET | Refills: 3 | Status: SHIPPED | OUTPATIENT
Start: 2025-04-21

## 2025-04-21 RX ORDER — HYDRALAZINE HYDROCHLORIDE 50 MG/1
50 TABLET, FILM COATED ORAL 3 TIMES DAILY
Qty: 270 TABLET | Refills: 3 | Status: CANCELLED | OUTPATIENT
Start: 2025-04-21

## 2025-04-21 RX ORDER — HYDRALAZINE HYDROCHLORIDE 25 MG/1
25 TABLET, FILM COATED ORAL 3 TIMES DAILY
Qty: 270 TABLET | Refills: 3 | Status: CANCELLED | OUTPATIENT
Start: 2025-04-21

## 2025-04-21 SDOH — ECONOMIC STABILITY: TRANSPORTATION INSECURITY
IN THE PAST 12 MONTHS, HAS THE LACK OF TRANSPORTATION KEPT YOU FROM MEDICAL APPOINTMENTS OR FROM GETTING MEDICATIONS?: NO

## 2025-04-21 SDOH — ECONOMIC STABILITY: FOOD INSECURITY: WITHIN THE PAST 12 MONTHS, THE FOOD YOU BOUGHT JUST DIDN'T LAST AND YOU DIDN'T HAVE MONEY TO GET MORE.: NEVER TRUE

## 2025-04-21 SDOH — ECONOMIC STABILITY: INCOME INSECURITY: IN THE LAST 12 MONTHS, WAS THERE A TIME WHEN YOU WERE NOT ABLE TO PAY THE MORTGAGE OR RENT ON TIME?: NO

## 2025-04-21 SDOH — ECONOMIC STABILITY: FOOD INSECURITY: WITHIN THE PAST 12 MONTHS, YOU WORRIED THAT YOUR FOOD WOULD RUN OUT BEFORE YOU GOT MONEY TO BUY MORE.: NEVER TRUE

## 2025-04-21 SDOH — ECONOMIC STABILITY: TRANSPORTATION INSECURITY
IN THE PAST 12 MONTHS, HAS LACK OF TRANSPORTATION KEPT YOU FROM MEETINGS, WORK, OR FROM GETTING THINGS NEEDED FOR DAILY LIVING?: NO

## 2025-04-21 ASSESSMENT — PATIENT HEALTH QUESTIONNAIRE - PHQ9
SUM OF ALL RESPONSES TO PHQ QUESTIONS 1-9: 0
SUM OF ALL RESPONSES TO PHQ QUESTIONS 1-9: 0
1. LITTLE INTEREST OR PLEASURE IN DOING THINGS: NOT AT ALL
SUM OF ALL RESPONSES TO PHQ9 QUESTIONS 1 & 2: 0
1. LITTLE INTEREST OR PLEASURE IN DOING THINGS: NOT AT ALL
2. FEELING DOWN, DEPRESSED OR HOPELESS: NOT AT ALL
SUM OF ALL RESPONSES TO PHQ QUESTIONS 1-9: 0
2. FEELING DOWN, DEPRESSED OR HOPELESS: NOT AT ALL
SUM OF ALL RESPONSES TO PHQ QUESTIONS 1-9: 0

## 2025-04-21 NOTE — PROGRESS NOTES
[facility]  Office Progress Note - Dr. Bautista  4/21/25    CC:   Chief Complaint   Patient presents with    Hypertension     Reports taking Hydralazine 100 mg TID.    Currently has 50 mg tabs.  Inquires about getting 100 mg tabs.         /84   Pulse 76   Temp 97.7 °F (36.5 °C) (Temporal)   Ht 1.88 m (6' 2\")   Wt 130.2 kg (287 lb)   SpO2 98%   BMI 36.85 kg/m²   Wt Readings from Last 3 Encounters:   04/21/25 130.2 kg (287 lb)   10/24/24 134.3 kg (296 lb)   10/21/24 135.6 kg (299 lb)       HPI:   REINALDO generated note:  History of Present Illness  The patient presents for evaluation of blood pressure and testosterone questions.    Satisfactory control of blood pressure is reported, with no discernible changes in condition since the adjustment of hydralazine dosage from 75 mg to 100 mg, administered three times daily. Feels well. The current medication regimen also includes hydrochlorothiazide QD and doxazosin, taken twice daily. Regular exercise is maintained at the gym, attending at least twice weekly, with reported well-being during these sessions. 12 pound weight loss at least since Oct.    Interest in having testosterone levels checked is expressed, citing occasional difficulties in achieving and maintaining erections. This issue is not consistent, as erections are achieved most of the time. Curiosity about testosterone levels is influenced by peers of similar age receiving testosterone injections.    A colonoscopy is scheduled on 05/23/2025 to address ongoing gastrointestinal issues. Previous evaluations, including a CT scan and a partial colonoscopy, indicated no issues. Initially, cancer was suspected, but subsequent tests ruled it out. A large polyp was removed, which was found to contain abnormal cells. Further excision of the polyp's stem and additional testing confirmed no malignancy. My review of available path showed a hyperplastic polyp.  Follow-up is planned to ensure

## 2025-04-28 ENCOUNTER — OFFICE VISIT (OUTPATIENT)
Dept: FAMILY MEDICINE CLINIC | Age: 50
End: 2025-04-28
Payer: COMMERCIAL

## 2025-04-28 VITALS
BODY MASS INDEX: 37.35 KG/M2 | DIASTOLIC BLOOD PRESSURE: 80 MMHG | HEART RATE: 73 BPM | HEIGHT: 74 IN | TEMPERATURE: 97.8 F | SYSTOLIC BLOOD PRESSURE: 122 MMHG | OXYGEN SATURATION: 96 % | WEIGHT: 291 LBS

## 2025-04-28 DIAGNOSIS — J01.90 ACUTE NON-RECURRENT SINUSITIS, UNSPECIFIED LOCATION: Primary | ICD-10-CM

## 2025-04-28 PROCEDURE — 3079F DIAST BP 80-89 MM HG: CPT | Performed by: FAMILY MEDICINE

## 2025-04-28 PROCEDURE — 99213 OFFICE O/P EST LOW 20 MIN: CPT | Performed by: FAMILY MEDICINE

## 2025-04-28 PROCEDURE — 3074F SYST BP LT 130 MM HG: CPT | Performed by: FAMILY MEDICINE

## 2025-04-28 NOTE — PROGRESS NOTES
Outpatient Medications:     amoxicillin-clavulanate (AUGMENTIN) 875-125 MG per tablet, Take 1 tablet by mouth 2 times daily for 10 days, Disp: 20 tablet, Rfl: 0    hydrALAZINE (APRESOLINE) 100 MG tablet, Take 1 tablet by mouth 3 times daily, Disp: 270 tablet, Rfl: 3    hydroCHLOROthiazide (HYDRODIURIL) 25 MG tablet, TAKE ONE TABLET BY MOUTH EVERY MORNING, Disp: 90 tablet, Rfl: 1    allopurinol (ZYLOPRIM) 300 MG tablet, TAKE ONE TABLET BY MOUTH ONCE DAILY FOR GOUT PREVENTION, Disp: 90 tablet, Rfl: 3    doxazosin (CARDURA) 2 MG tablet, Take 1 tablet by mouth 2 times daily bid, Disp: 180 tablet, Rfl: 3    triamcinolone (KENALOG) 0.1 % cream, Apply topically 1-2 times daily to rash areas., Disp: 45 g, Rfl: 0    clobetasol (TEMOVATE) 0.05 % ointment, Apply topically 1 time daily for a week, then give it a week rest before using again for a week., Disp: 30 g, Rfl: 0     Past Medical History:   Diagnosis Date    Hyperlipidemia     Hypertension        Assessment & Plan              Assessment & Plan  1. sinusitis  - Reports sore throat onset last night, with drainage, headache, head congestion, and pressure behind the eyes.  - No coughing, chest pain, shortness of breath, nausea, vomiting, or diarrhea.  - Physical examination shows slight redness in the throat, no swollen lymph nodes, and no facial pain.  - Recommended symptomatic treatment and monitoring for any progression of symptoms.        Sebastian was seen today for pharyngitis, drainage, headache, head congestion and pressure behind the eyes.    Diagnoses and all orders for this visit:    Acute non-recurrent sinusitis, unspecified location  -     amoxicillin-clavulanate (AUGMENTIN) 875-125 MG per tablet; Take 1 tablet by mouth 2 times daily for 10 days        YANCY GUO MD    The patient (or guardian, if applicable) and other individuals in attendance with the patient were advised that Artificial Intelligence will be utilized during this visit to record, process

## 2025-07-27 DIAGNOSIS — I10 ESSENTIAL HYPERTENSION: ICD-10-CM

## 2025-07-28 NOTE — TELEPHONE ENCOUNTER
Last Appointment:  4/21/2025  Future Appointments   Date Time Provider Department Center   10/21/2025  2:00 PM Richi Bautista MD [facility] DEP

## 2025-07-29 RX ORDER — DOXAZOSIN 2 MG/1
2 TABLET ORAL 2 TIMES DAILY
Qty: 180 TABLET | Refills: 3 | Status: SHIPPED | OUTPATIENT
Start: 2025-07-29